# Patient Record
Sex: MALE | Race: WHITE | HISPANIC OR LATINO | Employment: FULL TIME | URBAN - METROPOLITAN AREA
[De-identification: names, ages, dates, MRNs, and addresses within clinical notes are randomized per-mention and may not be internally consistent; named-entity substitution may affect disease eponyms.]

---

## 2017-12-20 ENCOUNTER — OFFICE VISIT (OUTPATIENT)
Dept: BEHAVIORAL HEALTH | Facility: PHYSICIAN GROUP | Age: 27
End: 2017-12-20
Payer: COMMERCIAL

## 2017-12-20 DIAGNOSIS — F41.9 ANXIETY: ICD-10-CM

## 2017-12-20 PROCEDURE — 90791 PSYCH DIAGNOSTIC EVALUATION: CPT | Performed by: SOCIAL WORKER

## 2017-12-20 RX ORDER — LISINOPRIL 5 MG/1
5 TABLET ORAL DAILY
COMMUNITY
End: 2018-02-20

## 2017-12-20 RX ORDER — ALPRAZOLAM 0.5 MG/1
0.5 TABLET ORAL NIGHTLY PRN
COMMUNITY
End: 2020-03-24
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
## 2018-01-03 ENCOUNTER — OFFICE VISIT (OUTPATIENT)
Dept: BEHAVIORAL HEALTH | Facility: PHYSICIAN GROUP | Age: 28
End: 2018-01-03
Payer: COMMERCIAL

## 2018-01-03 DIAGNOSIS — F41.1 GENERALIZED ANXIETY DISORDER: ICD-10-CM

## 2018-01-03 PROCEDURE — 90834 PSYTX W PT 45 MINUTES: CPT | Performed by: SOCIAL WORKER

## 2018-01-03 NOTE — BH THERAPY
" [facility]  Therapy Progress Note    Patient Name: Librado Johnston  Patient MRN: [id number]  Today's Date: 1/3/2018     Type of session:Individual psychotherapy  Length of session: 45 minutes  Persons in attendance:Patient    Subjective/New Info: Patient reports ongoing bouts of anxiety and ruminating about ongoing headache and possible medical implications associated.  He has seen his primary care physician who he reports diagnosed him with a sinus infection and prescribed anti biotics.  Sees PCP tomorrow and says he wants to request a referral for an MRI. States his anxiety is interfering with relationship with Scot who thinks he is \"behaving like a hypochondriac.\"  Discussed patient's anxiety related to this relationship and he rates it as 9 on a scale of 1-10.  Cutes work and financial stressors also and notes he is able to work on paying off debt and seek more suitable employment while living in current situation. Librado compares himself to his mother in the realm of staying in an unfulfilling relationship rather than risk change by leaving.  He agrees to make a list of credit cards with attached debt and work on payment plan.  He will also write list of items to discuss with his doctor as means of defining specific stressors and exhibiting what control he can take at this time.      Objective/Observations:   Participation: Active verbal participation, Attentive, Engaged and Open to feedback   Grooming: Neat   Cognition: Fully Oriented   Eye contact: Good   Mood: Anxious   Affect: Constricted   Thought process: Goal-directed   Speech: Rate within normal limits   Other:     Diagnoses:   1. Generalized anxiety disorder         Current risk:   SUICIDE: Low   Homicide: Not applicable   Self-harm: Not applicable   Relapse: Not applicable   Other:    Safety Plan reviewed? Not Indicated   If evidence of imminent risk is present, intervention/plan:     Therapeutic Intervention(s): Distress tolerance skills, " Interpersonal effectiveness skills and Problem-solving    Treatment Goal(s)/Objective(s) addressed: Resolve core conflict that is the source of anxiety     Progress toward Treatment Goals: Mild improvement    Plan:Patient will reduce overall frequency, intensity, and duration of the anxiety so that daily functioning is not impaired.  - Next appointment scheduled:  1/17/2018  - Patient is in agreement with the above plan:  YES    Corinne G. Taylor, L.C.SNICHOLAS  1/3/2018

## 2018-01-17 ENCOUNTER — APPOINTMENT (OUTPATIENT)
Dept: BEHAVIORAL HEALTH | Facility: PHYSICIAN GROUP | Age: 28
End: 2018-01-17
Payer: COMMERCIAL

## 2018-02-07 ENCOUNTER — APPOINTMENT (OUTPATIENT)
Dept: BEHAVIORAL HEALTH | Facility: PHYSICIAN GROUP | Age: 28
End: 2018-02-07
Payer: COMMERCIAL

## 2018-02-15 ENCOUNTER — OFFICE VISIT (OUTPATIENT)
Dept: BEHAVIORAL HEALTH | Facility: PHYSICIAN GROUP | Age: 28
End: 2018-02-15
Payer: COMMERCIAL

## 2018-02-15 VITALS — HEIGHT: 71 IN | BODY MASS INDEX: 33.04 KG/M2 | WEIGHT: 236 LBS

## 2018-02-15 DIAGNOSIS — F41.1 GAD (GENERALIZED ANXIETY DISORDER): ICD-10-CM

## 2018-02-15 PROCEDURE — 90792 PSYCH DIAG EVAL W/MED SRVCS: CPT | Performed by: NURSE PRACTITIONER

## 2018-02-15 ASSESSMENT — PATIENT HEALTH QUESTIONNAIRE - PHQ9
SUM OF ALL RESPONSES TO PHQ9 QUESTIONS 1 AND 2: 0
1. LITTLE INTEREST OR PLEASURE IN DOING THINGS: 0
2. FEELING DOWN, DEPRESSED, IRRITABLE, OR HOPELESS: 0
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
## 2018-02-20 PROBLEM — F41.1 GAD (GENERALIZED ANXIETY DISORDER): Status: ACTIVE | Noted: 2018-02-20

## 2018-02-20 RX ORDER — DIPHENHYDRAMINE HCL 25 MG
25-50 TABLET ORAL NIGHTLY PRN
COMMUNITY
End: 2020-03-24

## 2018-02-20 RX ORDER — TIZANIDINE 4 MG/1
4 TABLET ORAL PRN
COMMUNITY
Start: 2018-01-26 | End: 2020-03-24

## 2018-03-06 ENCOUNTER — DOCUMENTATION (OUTPATIENT)
Dept: BEHAVIORAL HEALTH | Facility: PHYSICIAN GROUP | Age: 28
End: 2018-03-06

## 2018-07-25 ENCOUNTER — OFFICE VISIT (OUTPATIENT)
Dept: NEUROLOGY | Facility: MEDICAL CENTER | Age: 28
End: 2018-07-25
Payer: COMMERCIAL

## 2018-07-25 VITALS
HEIGHT: 71 IN | TEMPERATURE: 99.3 F | HEART RATE: 110 BPM | BODY MASS INDEX: 35.84 KG/M2 | SYSTOLIC BLOOD PRESSURE: 142 MMHG | DIASTOLIC BLOOD PRESSURE: 86 MMHG | OXYGEN SATURATION: 96 % | RESPIRATION RATE: 16 BRPM | WEIGHT: 256 LBS

## 2018-07-25 DIAGNOSIS — G44.209 TENSION HEADACHE: ICD-10-CM

## 2018-07-25 DIAGNOSIS — F51.01 PRIMARY INSOMNIA: ICD-10-CM

## 2018-07-25 PROCEDURE — 99204 OFFICE O/P NEW MOD 45 MIN: CPT | Performed by: PHYSICIAN ASSISTANT

## 2018-07-25 RX ORDER — INDOMETHACIN 25 MG/1
25 CAPSULE ORAL 3 TIMES DAILY
Qty: 90 CAP | Refills: 1 | Status: SHIPPED | OUTPATIENT
Start: 2018-07-25 | End: 2018-08-29

## 2018-07-25 ASSESSMENT — PATIENT HEALTH QUESTIONNAIRE - PHQ9
CLINICAL INTERPRETATION OF PHQ2 SCORE: 1
5. POOR APPETITE OR OVEREATING: 0 - NOT AT ALL
SUM OF ALL RESPONSES TO PHQ QUESTIONS 1-9: 6

## 2018-07-25 NOTE — PATIENT INSTRUCTIONS
Indocin 25 mg capsule - up to three times daily.   pepcid and take one of those while you are taking.      For about 3 days take three a day.  If headache is gone after 3 days cut back to two capsules a day.  After 3 days if headache is gone cut back to 1 capsule. Continue to take the lowest dose you need to have the headache gone for a month.     Daily Preventative Treatment:  Vitamins - handout provided    Referral to hong acharya for sleep evaluation    Yoga - gentle, restorative, yin yoga    Return appt in about 2 months

## 2018-07-25 NOTE — PROGRESS NOTES
Subjective:      Librado Johnston is a 28 y.o. male who presents with Establish Care (Headaches)    Chief Complaint/Reason for referral:  headaches    History of Present Illness:   Describe your headaches to me:  Started back in November 2017.  It was around Thanksgiving.  He had gotten a [employer] dog about two months prior.  He had a lot of stress and anxiety prior to this time.  He had a couple panic attacks around this time.  He felt tension on the top of his head around this time.    He tried multiple OTC meds and nothing worked.  He also began coughing with sinus drainage around this time.  His pcp put him on antibiotics and sinus meds - no benefit.  ENT did the same and no improvement.    He was headed out for a trip in February and he was found to have a sinus infection and deviated septum on CT.  No MRI was done.    Headaches continued daily - using flonase daily and he also underwent an allergy panel.  He had some evidence of allergic responses with grass, mites, etc all coming back positive.  He has an allergist now and skin test showed allergy to dogs.  He's doing immunotherapy at this time.    Headache daily - always across top of head or forehead.  He feels it pulsing/moving.  Mild pain - mostly now not as intense.  When he travels it can become more intense - especially if he changes elevation.  He feels a tightness across his forehead also sometimes.    He can sleep through them, do all his normal activities, typically come on some time every day and then last more than 4 hours.  Making him anxious but not really affecting his ability to .      He denies any symptoms suggestive of migraine headaches.    Do you get an aura or any symptoms that typically begin PRIOR to headache onset? no    Are you nauseated or sick to your stomach when you have a headache?  n  Does light bother you when you have a headache?   __n_______  Does sound or noise bother you when you have a headache?    ____n_____    Neurologic symptoms with headaches (weakness, numbness, vertigo, speech changes, cognitive changes)  Some tightness in his head      Do you have any neck or jaw pain with headaches?  No neck pain  Grinds teeth - has guard but doesn't wear it.  After being asked to wear it he then says he wore it everyday for two months and it didn't make a difference    Have you identified any triggers for your headaches (dehydration, poor sleep, low blood sugar, alcohol 35% (grey wine) chocolate 22%, cheese 9%, citrus fruit 11%)? none    Do you feel restless like you want to pace around with your headaches or do you feel like lying down to make your headache feel better/less severe? neither    Do headaches start by coughing, sneezing, bending over, Valsalva maneuver, sexual activity? no    Do headaches start shortly after you lie down to go to bed or shortly after you get up from bed in the morning? Typically come in during afternoon     Have you noticed a menstrual pattern to your headaches? n/a    Have you ever kept a headache diary?    How many days do you keep ANY type of headache in any given month?  3 or fewer days______   Between 3 and 6 days______   Between 6 and 10 days_____  Between 11 and 14 days____  15 or more headache/days per month_X____  Has severe headaches _0___ days per month    Family members with headaches:  Brother has migraines, maybe his mom    Co--morbid conditions:    Conditions that affect diagnosis and treatment:  Depression  Yes in past but now right now        Anxiety     Yes       Sleep disorders:   Yes - a lot of problems falling asleep.  He takes benadryl and xanax.       Obesity  Y    History of TBI N            Pregnancy/family planning   N/a    Fibromyalgia   N    Social History:  Do you drink any caffeine? Yes__X___ No____   How many days per week?  2 or fewer days______  3 or more days__X____    Do you drink alcohol?  Yes    Do you use recreational drugs including medicinal  "marijuana?  Tried mj for headaches but it didn't help    Do you smoke cigarettes? 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 none    Who and where do you live? [place] with his mom, lives in [place] on weekends - partner lives there.    What is your exercise program:  Not during work week - works 10's    Have you had an MRI done?  no      PMH reviewed - no kidney stones, no asthma, no mi, no stroke    Medications and Allergies Reviewed     What are you taking right now for your headaches/#days per month of acute medication use:     Not typically - tylenol nothing like that ever worked    Prior acute treatments:  Medication/dose/timing/route/worked or side-effects?    Tylenol and other otc meds    What are you taking right now - daily - to prevent headaches?/dose    none      Any prior prophylactic treatments:  Medications/dose/frequency/duration of treatment/worked or side effects?    Losartan for htn                                                                                                               HPI    ROS       Objective:     /86   Pulse (!) 110   Temp 37.4 °C (99.3 °F)   Resp 16   Ht 1.803 m (5' 11\")   Wt 116.1 kg (256 lb)   SpO2 96%   BMI 35.70 kg/m²      Physical Exam   Constitutional: He is oriented to person, place, and time. He appears well-developed and well-nourished.   overweight   Eyes: Pupils are equal, round, and reactive to light. EOM are normal.   Pulmonary/Chest: Effort normal.   Musculoskeletal: Normal range of motion.   Neurological: He is alert and oriented to person, place, and time. No cranial nerve deficit or sensory deficit. He exhibits normal muscle tone. Coordination normal.   Skin: Skin is warm and dry.   Psychiatric: Judgment and thought content normal.   Appears nervous               Assessment/Plan:   Tension headaches - worsened by stress and poor sleep habits.      Plan:    Indocin 25 " mg capsule - up to three times daily.   pepcid and take one of those while you are taking.      For about 3 days take three a day.  If headache is gone after 3 days cut back to two capsules a day.  After 3 days if headache is gone cut back to 1 capsule. Continue to take the lowest dose you need to have the headache gone for a month.     Daily Preventative Treatment:  Vitamins - handout provided - pick one of the meds and start taking it.    Referral to hong acharya for sleep evaluation    Yoga - gentle, restorative, yin yoga    Return appt in about 2 months

## 2018-08-29 DIAGNOSIS — Z01.812 PRE-OPERATIVE LABORATORY EXAMINATION: ICD-10-CM

## 2018-08-29 LAB
ANION GAP SERPL CALC-SCNC: 8 MMOL/L (ref 0–11.9)
BUN SERPL-MCNC: 13 MG/DL (ref 8–22)
CALCIUM SERPL-MCNC: 10 MG/DL (ref 8.5–10.5)
CHLORIDE SERPL-SCNC: 102 MMOL/L (ref 96–112)
CO2 SERPL-SCNC: 29 MMOL/L (ref 20–33)
CREAT SERPL-MCNC: 0.93 MG/DL (ref 0.5–1.4)
GLUCOSE SERPL-MCNC: 93 MG/DL (ref 65–99)
POTASSIUM SERPL-SCNC: 4.2 MMOL/L (ref 3.6–5.5)
SODIUM SERPL-SCNC: 139 MMOL/L (ref 135–145)

## 2018-08-29 PROCEDURE — 36415 COLL VENOUS BLD VENIPUNCTURE: CPT

## 2018-08-29 PROCEDURE — 80048 BASIC METABOLIC PNL TOTAL CA: CPT

## 2018-08-29 RX ORDER — METHYLPHENIDATE HYDROCHLORIDE 10 MG/1
10 TABLET ORAL EVERY MORNING
COMMUNITY
End: 2020-03-24

## 2018-08-29 RX ORDER — TRAZODONE HYDROCHLORIDE 50 MG/1
50 TABLET ORAL NIGHTLY PRN
COMMUNITY
End: 2020-03-24

## 2018-08-29 RX ORDER — LOSARTAN POTASSIUM 25 MG/1
25 TABLET ORAL EVERY MORNING
COMMUNITY
End: 2020-03-24

## 2018-09-04 ENCOUNTER — HOSPITAL ENCOUNTER (OUTPATIENT)
Facility: MEDICAL CENTER | Age: 28
End: 2018-09-04
Attending: OTOLARYNGOLOGY | Admitting: OTOLARYNGOLOGY
Payer: COMMERCIAL

## 2018-09-04 VITALS
DIASTOLIC BLOOD PRESSURE: 93 MMHG | BODY MASS INDEX: 35.37 KG/M2 | RESPIRATION RATE: 16 BRPM | WEIGHT: 252.65 LBS | TEMPERATURE: 97.4 F | HEART RATE: 94 BPM | OXYGEN SATURATION: 96 % | SYSTOLIC BLOOD PRESSURE: 160 MMHG | HEIGHT: 71 IN

## 2018-09-04 PROCEDURE — 160048 HCHG OR STATISTICAL LEVEL 1-5: Performed by: OTOLARYNGOLOGY

## 2018-09-04 PROCEDURE — 160035 HCHG PACU - 1ST 60 MINS PHASE I: Performed by: OTOLARYNGOLOGY

## 2018-09-04 PROCEDURE — 160029 HCHG SURGERY MINUTES - 1ST 30 MINS LEVEL 4: Performed by: OTOLARYNGOLOGY

## 2018-09-04 PROCEDURE — 700101 HCHG RX REV CODE 250

## 2018-09-04 PROCEDURE — 502573 HCHG PACK, ENT: Performed by: OTOLARYNGOLOGY

## 2018-09-04 PROCEDURE — 500331 HCHG COTTONOID, SURG PATTIE: Performed by: OTOLARYNGOLOGY

## 2018-09-04 PROCEDURE — 160036 HCHG PACU - EA ADDL 30 MINS PHASE I: Performed by: OTOLARYNGOLOGY

## 2018-09-04 PROCEDURE — 110454 HCHG SHELL REV 250: Performed by: OTOLARYNGOLOGY

## 2018-09-04 PROCEDURE — 700111 HCHG RX REV CODE 636 W/ 250 OVERRIDE (IP)

## 2018-09-04 PROCEDURE — 501404 HCHG SPLINT, NASAL DOYLE AIRWAY: Performed by: OTOLARYNGOLOGY

## 2018-09-04 PROCEDURE — 160002 HCHG RECOVERY MINUTES (STAT): Performed by: OTOLARYNGOLOGY

## 2018-09-04 PROCEDURE — A9270 NON-COVERED ITEM OR SERVICE: HCPCS

## 2018-09-04 PROCEDURE — 700102 HCHG RX REV CODE 250 W/ 637 OVERRIDE(OP)

## 2018-09-04 PROCEDURE — 501838 HCHG SUTURE GENERAL: Performed by: OTOLARYNGOLOGY

## 2018-09-04 PROCEDURE — 160041 HCHG SURGERY MINUTES - EA ADDL 1 MIN LEVEL 4: Performed by: OTOLARYNGOLOGY

## 2018-09-04 PROCEDURE — 160009 HCHG ANES TIME/MIN: Performed by: OTOLARYNGOLOGY

## 2018-09-04 RX ORDER — OXYMETAZOLINE HYDROCHLORIDE 0.05 G/100ML
SPRAY NASAL
Status: DISCONTINUED | OUTPATIENT
Start: 2018-09-04 | End: 2018-09-04 | Stop reason: HOSPADM

## 2018-09-04 RX ORDER — LIDOCAINE HYDROCHLORIDE AND EPINEPHRINE 10; 10 MG/ML; UG/ML
INJECTION, SOLUTION INFILTRATION; PERINEURAL
Status: DISCONTINUED | OUTPATIENT
Start: 2018-09-04 | End: 2018-09-04 | Stop reason: HOSPADM

## 2018-09-04 RX ORDER — OXYMETAZOLINE HYDROCHLORIDE 0.05 G/100ML
SPRAY NASAL
Status: DISCONTINUED
Start: 2018-09-04 | End: 2018-09-04 | Stop reason: HOSPADM

## 2018-09-04 RX ORDER — ACETAMINOPHEN 500 MG
1000 TABLET ORAL EVERY 6 HOURS
Status: DISCONTINUED | OUTPATIENT
Start: 2018-09-04 | End: 2018-09-04 | Stop reason: HOSPADM

## 2018-09-04 RX ORDER — SODIUM CHLORIDE, SODIUM LACTATE, POTASSIUM CHLORIDE, CALCIUM CHLORIDE 600; 310; 30; 20 MG/100ML; MG/100ML; MG/100ML; MG/100ML
INJECTION, SOLUTION INTRAVENOUS CONTINUOUS
Status: DISCONTINUED | OUTPATIENT
Start: 2018-09-04 | End: 2018-09-04 | Stop reason: HOSPADM

## 2018-09-04 RX ORDER — OXYCODONE HYDROCHLORIDE 5 MG/1
TABLET ORAL
Status: DISCONTINUED
Start: 2018-09-04 | End: 2018-09-04 | Stop reason: HOSPADM

## 2018-09-04 RX ORDER — OXYMETAZOLINE HYDROCHLORIDE 0.05 G/100ML
SPRAY NASAL
Status: COMPLETED
Start: 2018-09-04 | End: 2018-09-04

## 2018-09-04 RX ORDER — OXYCODONE HYDROCHLORIDE AND ACETAMINOPHEN 5; 325 MG/1; MG/1
TABLET ORAL
Status: COMPLETED
Start: 2018-09-04 | End: 2018-09-04

## 2018-09-04 RX ORDER — OXYCODONE HYDROCHLORIDE 5 MG/1
5 TABLET ORAL
Status: DISCONTINUED | OUTPATIENT
Start: 2018-09-04 | End: 2018-09-04 | Stop reason: HOSPADM

## 2018-09-04 RX ORDER — BACITRACIN ZINC 500 [USP'U]/G
OINTMENT TOPICAL
Status: DISCONTINUED
Start: 2018-09-04 | End: 2018-09-04 | Stop reason: HOSPADM

## 2018-09-04 RX ORDER — LIDOCAINE HYDROCHLORIDE AND EPINEPHRINE 10; 10 MG/ML; UG/ML
INJECTION, SOLUTION INFILTRATION; PERINEURAL
Status: DISCONTINUED
Start: 2018-09-04 | End: 2018-09-04 | Stop reason: HOSPADM

## 2018-09-04 RX ORDER — BACITRACIN ZINC 500 [USP'U]/G
OINTMENT TOPICAL
Status: DISCONTINUED | OUTPATIENT
Start: 2018-09-04 | End: 2018-09-04 | Stop reason: HOSPADM

## 2018-09-04 RX ORDER — ONDANSETRON 2 MG/ML
4 INJECTION INTRAMUSCULAR; INTRAVENOUS
Status: DISCONTINUED | OUTPATIENT
Start: 2018-09-04 | End: 2018-09-04 | Stop reason: HOSPADM

## 2018-09-04 RX ORDER — LIDOCAINE HYDROCHLORIDE 10 MG/ML
0.5 INJECTION, SOLUTION INFILTRATION; PERINEURAL
Status: DISCONTINUED | OUTPATIENT
Start: 2018-09-04 | End: 2018-09-04 | Stop reason: HOSPADM

## 2018-09-04 RX ADMIN — OXYCODONE AND ACETAMINOPHEN 2 TABLET: 5; 325 TABLET ORAL at 12:10

## 2018-09-04 RX ADMIN — FENTANYL CITRATE 25 MCG: 50 INJECTION, SOLUTION INTRAMUSCULAR; INTRAVENOUS at 13:10

## 2018-09-04 RX ADMIN — FENTANYL CITRATE 25 MCG: 50 INJECTION, SOLUTION INTRAMUSCULAR; INTRAVENOUS at 13:05

## 2018-09-04 RX ADMIN — SODIUM CHLORIDE, SODIUM LACTATE, POTASSIUM CHLORIDE, CALCIUM CHLORIDE 1000 ML: 600; 310; 30; 20 INJECTION, SOLUTION INTRAVENOUS at 09:49

## 2018-09-04 RX ADMIN — OXYMETAZOLINE HYDROCHLORIDE 2 SPRAY: 5 SPRAY NASAL at 09:20

## 2018-09-04 ASSESSMENT — PAIN SCALES - GENERAL
PAINLEVEL_OUTOF10: 5
PAINLEVEL_OUTOF10: 0
PAINLEVEL_OUTOF10: 2
PAINLEVEL_OUTOF10: 2
PAINLEVEL_OUTOF10: 3
PAINLEVEL_OUTOF10: 0
PAINLEVEL_OUTOF10: 2
PAINLEVEL_OUTOF10: 3
PAINLEVEL_OUTOF10: 5
PAINLEVEL_OUTOF10: 3

## 2018-09-04 NOTE — OR SURGEON
Immediate Post OP Note    PreOp Diagnosis: DNS, Bilateral Inferior Turbinate Hypertrophy.     PostOp Diagnosis: sane    Procedure(s):  TURBINATE REDUCTION - SUBMUCOSAL APPROACH - Wound Class: Clean Contaminated  SEPTOPLASTY - Wound Class: Clean Contaminated    Surgeon(s):  Alexis Syed M.D.    Anesthesiologist/Type of Anesthesia:  Anesthesiologist: Ernie Norton D.O./General    Surgical Staff:  Circulator: Floresita Roe R.N.  Relief Circulator: Chante Phelan R.N.  Scrub Person: Quinten Ronquillo    Specimens removed if any:  * No specimens in log *    Estimated Blood Loss: 50 cc    Findings: Severe left DNS, 3+, bilateral inferior turbinate hypertrophy    Complications: none        9/4/2018 11:29 AM Alexis Syed M.D.

## 2018-09-04 NOTE — OR NURSING
1125-assumed care.  States comfort. Small amount of drainage to nasal gauze.    1145-taking PO fluids.    1210-C/O pain 5/10, medicated with crackers & water    1230-states some comfort  1240-report to Dee CHAO.  1330-assumed care.  States comfort.  Ice pack to forehead area in place  1345-Friend in to visit.  1400-D/C instructions discussed & signed.  Questions answered  1420-D/C via W/C to car.  States comfort. Nasal drip pad changed

## 2018-09-04 NOTE — OR NURSING
1115 Patient arrived from OR on [facility]. Report received from anesthesia and RN. Oral airway in place. Will continue to monitor.   1119 pt woke up, airway out  1120 Hand-off to JEREMIE Fernandez

## 2018-09-04 NOTE — DISCHARGE INSTRUCTIONS
1210  ACTIVITY: Rest and take it easy for the first 24 hours.  A responsible adult is recommended to remain with you during that time.  It is normal to feel sleepy.  We encourage you to not do anything that requires balance, judgment or coordination.  MILD FLU-LIKE SYMPTOMS ARE NORMAL. YOU MAY EXPERIENCE GENERALIZED MUSCLE ACHES, THROAT IRRITATION, HEADACHE AND/OR SOME NAUSEA.    FOR 24 HOURS DO NOT:  Drive, operate machinery or run household appliances.  Drink beer or alcoholic beverages.   Make important decisions or sign legal documents.    SPECIAL INSTRUCTIONS: *FOLLOW DR CHISHOLM'S INSTRUCTIONS**  Diet as tolerated, no strenuous activity, may shower in 24 h.  Afrin 3 sprays to affected nostril prn heavy bleeding.  Follow up:9/7 2:30 pm.     DIET: To avoid nausea, slowly advance diet as tolerated, avoiding spicy or greasy foods for the first day.  Add more substantial food to your diet according to your physician's instructions.  Babies can be fed formula or breast milk as soon as they are hungry.  INCREASE FLUIDS AND FIBER TO AVOID CONSTIPATION.    SURGICAL DRESSING/BATHING: *MAY SHOWER TOMORROW 9/5**    FOLLOW-UP APPOINTMENT:  A follow-up appointment  *is 9/7 Friday at 2:30pm*    You should CALL YOUR PHYSICIAN if you develop:  Fever greater than 101 degrees F.  Pain not relieved by medication, or persistent nausea or vomiting.  Excessive bleeding (blood soaking through dressing) or unexpected drainage from the wound.  Extreme redness or swelling around the incision site, drainage of pus or foul smelling drainage.  Inability to urinate or empty your bladder within 8 hours.  Problems with breathing or chest pain.    You should call 911 if you develop problems with breathing or chest pain.  If you are unable to contact your doctor or surgical center, you should go to the nearest emergency room or urgent care center.  Physician's telephone #: *[phone]**    If any questions arise, call your doctor.  If your doctor  is not available, please feel free to call the Surgical Center at [phone].  The Center is open Monday through Friday from 7AM to 7PM.  You can also call the HEALTH HOTLINE open 24 hours/day, 7 days/week and speak to a nurse at [phone], or toll free at [phone].    A registered nurse may call you a few days after your surgery to see how you are doing after your procedure.    MEDICATIONS: Resume taking daily medication.  Take prescribed pain medication with food.  If no medication is prescribed, you may take non-aspirin pain medication if needed.  PAIN MEDICATION CAN BE VERY CONSTIPATING.  Take a stool softener or laxative such as senokot, pericolace, or milk of magnesia if needed.    Prescription given for *Oxycodone, Keflex, Zofran**.  Last pain medication given at *Oxycodone 5/325 (2 tabs) at *1210*.    If your physician has prescribed pain medication that includes Acetaminophen (Tylenol), do not take additional Acetaminophen (Tylenol) while taking the prescribed medication.    Depression / Suicide Risk    As you are discharged from this [facility] facility, it is important to learn how to keep safe from harming yourself.    Recognize the warning signs:  · Abrupt changes in personality, positive or negative- including increase in energy   · Giving away possessions  · Change in eating patterns- significant weight changes-  positive or negative  · Change in sleeping patterns- unable to sleep or sleeping all the time   · Unwillingness or inability to communicate  · Depression  · Unusual sadness, discouragement and loneliness  · Talk of wanting to die  · Neglect of personal appearance   · Rebelliousness- reckless behavior  · Withdrawal from people/activities they love  · Confusion- inability to concentrate     If you or a loved one observes any of these behaviors or has concerns about self-harm, here's what you can do:  · Talk about it- your feelings and reasons for harming yourself  · Remove any  means that you might use to hurt yourself (examples: pills, rope, extension cords, firearm)  · Get professional help from the community (Mental Health, Substance Abuse, psychological counseling)  · Do not be alone:Call your Safe Contact- someone whom you trust who will be there for you.  · Call your local CRISIS HOTLINE 684-2482 or 692-138-0808  · Call your local Children's Mobile Crisis Response Team [place] (983) 168-6282 or www.Digitick  · Call the toll free National Suicide Prevention Hotlines   · National Suicide Prevention Lifeline 782-251-TIID (7921)  · National Hope Line Network 800-SUICIDE (091-9669)

## 2018-09-04 NOTE — OR NURSING
1240 Report received and care assumed. Pt appears comfortable. Pain 4/10 and tolerable, no nausea, tolerating fluids well. Nasal sling replaced taped gauze, scant amount of oozing noted each nostril and clots noted bilaterally.  1305 Pt tolerating sips of soda. Pt's pain increasing 5/10. IV pain medication provided. See MAR. Facial ice pack placed. Pt's ride called.  1310 Additional medication provided, see MAR.  1325 Pt states pain tolerable 3/10, denies nausea. Report given to JEREMIE Fernandez and care transferred.

## 2018-09-05 NOTE — OP REPORT
DATE OF SERVICE:  09/04/2018    PREOPERATIVE DIAGNOSES:  1.  Deviated nasal septum.  2.  Bilateral inferior turbinate hypertrophy.    POSTOPERATIVE DIAGNOSES:  1.  Deviated nasal septum.  2.  Bilateral inferior turbinate hypertrophy.    PROCEDURES:  1.  Nasal septal reconstruction.  2.  Bilateral inferior turbinoplasties.    SURGEON:  Alexis Syed MD    ANESTHESIOLOGIST:  Ernie Norton DO    INDICATIONS:  The patient is a 28-year-old who has had a history of nasal   obstruction and discomfort in the left side of his head and in between his eye   and his nose.  He has had CT scanning, which revealed a significantly   deviated septum to the left, but no evidence of sinusitis.  The patient has   been on nasal steroids and has seen a neurologist for the head pain.  He was   put on indomethacin, but that failed to resolve his problem.  He has tried   coming off his caffeine, but that has not helped either.  Physical exam   revealed a septal deviation to the left and inferior turbinate hypertrophy.    He presents now for nasal surgery for obstruction.  I also hope that part of   his headache may be rhinogenic due to the severity of the septal deviation.    DESCRIPTION OF PROCEDURE:  The patient was taken to the operating room and   placed in a supine position.  General anesthesia was induced and the patient   was easily intubated.  Lidocaine 1% with 1:100,000 epinephrine was used to   infiltrate the septum on both sides as well as the leading edge of each   inferior turbinate using approximately 4 mL overall.  The nasal cavity was   then packed with Afrin-soaked sponge.  The patient was then draped in the   usual fashion for this type of procedure.  After draping, the cottonoids on   the left side were removed.  An incision was made along the caudal aspect of   the septum on the left and taken to and through the perichondrium.  A   subperichondrial flap was elevated off the deviated portions of bone and    cartilage on the left side.  The patient had a significantly deviated septum   with a large spur posteriorly.  An incision was made at the bony cartilaginous   junction and a flap elevated on the right side.  Deviated portions of bone   and cartilage were then removed back to the sphenoid face.  Additionally, a   strip of cartilage was taken inferiorly off the maxillary crest allowing the   cartilage to sit more neutrally in the midline.  Nasal tip support was still   excellent.  This resolved treatment on the septum.  The right inferior   turbinate was then addressed.  An incision was made anteriorly and then a   caudal elevator used to elevate the mucosa off the turbinate bone.  A 2.9 mm   shaver blade was then used to submucosally debride the turbinate.  The   turbinate was then partially resected with the turbinate scissors as well.    The posterior tuft of turbinate was debrided with a shaver blade and then   suction cautery used to lightly cauterize the cut edge of the turbinate.  The   procedure was repeated identically on the opposite side.  The nasal cavity was   then vigorously irrigated and the irrigant suctioned.  Lomax splints were   placed and sutured into position with 3-0 nylon.  Surgiflo was then placed   along the cut edge of each inferior turbinate.  The patient was then awakened   and taken to the recovery room in stable condition.  He tolerated the   procedure well and there were no complications.  Estimated blood loss was 20   mL.       ____________________________________     MD VINNIE FLETCHER / RAMAN    DD:  09/05/2018 11:56:41  DT:  09/05/2018 12:24:23    D#:  [id number]  Job#:  [id number]    cc: JUDY ZHU MD

## 2018-12-26 ENCOUNTER — OFFICE VISIT (OUTPATIENT)
Dept: NEUROLOGY | Facility: MEDICAL CENTER | Age: 28
End: 2018-12-26
Payer: COMMERCIAL

## 2018-12-26 VITALS
SYSTOLIC BLOOD PRESSURE: 138 MMHG | DIASTOLIC BLOOD PRESSURE: 82 MMHG | TEMPERATURE: 98.1 F | HEIGHT: 71 IN | HEART RATE: 82 BPM | BODY MASS INDEX: 34.97 KG/M2 | WEIGHT: 249.8 LBS | OXYGEN SATURATION: 97 %

## 2018-12-26 DIAGNOSIS — G44.209 TENSION HEADACHE: ICD-10-CM

## 2018-12-26 PROCEDURE — 99213 OFFICE O/P EST LOW 20 MIN: CPT | Performed by: PHYSICIAN ASSISTANT

## 2018-12-26 RX ORDER — AMITRIPTYLINE HYDROCHLORIDE 10 MG/1
TABLET, FILM COATED ORAL
Qty: 75 TAB | Refills: 11 | Status: SHIPPED | OUTPATIENT
Start: 2018-12-26 | End: 2018-12-26 | Stop reason: SDUPTHER

## 2018-12-26 RX ORDER — ZOLPIDEM TARTRATE 10 MG/1
10 TABLET ORAL NIGHTLY PRN
COMMUNITY
End: 2020-09-22

## 2018-12-26 RX ORDER — DEXTROAMPHETAMINE SACCHARATE, AMPHETAMINE ASPARTATE, DEXTROAMPHETAMINE SULFATE AND AMPHETAMINE SULFATE 2.5; 2.5; 2.5; 2.5 MG/1; MG/1; MG/1; MG/1
10 TABLET ORAL 2 TIMES DAILY
COMMUNITY

## 2018-12-26 RX ORDER — AMITRIPTYLINE HYDROCHLORIDE 10 MG/1
TABLET, FILM COATED ORAL
Qty: 75 TAB | Refills: 11 | Status: SHIPPED | OUTPATIENT
Start: 2018-12-26 | End: 2020-03-24

## 2018-12-26 NOTE — PROGRESS NOTES
Cc:  Migraine/headache follow up    Last visit was the first time I saw this patient 7/25/18  At that time:    How many days do you keep ANY type of headache in any given month?  3 or fewer days______   Between 3 and 6 days______   Between 6 and 10 days_____  Between 11 and 14 days____  15 or more headache/days per month_X____  Has severe headaches _0___ days per month    What are you taking right now for your headaches/#days per month of acute medication use:      Not typically - tylenol nothing like that ever worked     Prior acute treatments:  Medication/dose/timing/route/worked or side-effects?     Tylenol and other otc meds     What are you taking right now - daily - to prevent headaches?/dose     none      Any prior prophylactic treatments:  Medications/dose/frequency/duration of treatment/worked or side effects?     Losartan for htn    Our plan at the last visit:    Tension headaches - worsened by stress and poor sleep habits.       Plan:     Indocin 25 mg capsule - up to three times daily.   pepcid and take one of those while you are taking.       For about 3 days take three a day.  If headache is gone after 3 days cut back to two capsules a day.  After 3 days if headache is gone cut back to 1 capsule. Continue to take the lowest dose you need to have the headache gone for a month.      Daily Preventative Treatment:  Vitamins - handout provided - pick one of the meds and start taking it.     Referral to hong acharya for sleep evaluation     Yoga - gentle, restorative, yin yoga     Return appt in about 2 months        Interval History:      Per OR note pt reported that the indocin did not help with reduction in headache frequency. He was found to have severely deviated septum and underwent surgery in hope this would help alleviate the problem.   Pt said it is helping some but ENT said it could take up to a year to reduce headache frequency.    He feels his headaches are worse with he is at work -  probably related to stress he thinks.  I ?? If fluorescent lights could be contributing although his headaches do not have many migrainus features    Tension headache frequency: daily now.    Indocin 25 mg  - how many taking??  Quit taking because he said it didn't work.  Never tried more than 25 mg at a time    Prevention:  Vitamin started? No forgot  Did you see Khadijah Buck? No. Says insurance wouldn't cover  Stress reduction with yoga??  Getting some exercise now he says but not helping with headache.    I suggested to patient taking a daily headache.  He was hesitant.  First question he asked is whether he can still drink alcohol with medication.  I counseled against use of alcohol in general because it is such a big trigger and then he said he never really drinks.  Then discussion regarding ambien.  Pt does not wish to take daily medicine that could reduce headache frequency and help with sleep but takes ambien almost nightly.  I advised that he should not be using ambien with such frequency and that elavil is better option and pt agreed to try it but I'm not sure he will comply.  He wants a cure for his daily headaches but he needs to continue to work toward good sleep, healthy lifestyle (exercise and healthy eating), stress reduction such as mindfulness or yoga and waiting for healing of septum.          Plan:    Tension headache worsened by stress and poor sleep habits: see comments above and elavil started    rTC 6 months or earlier if elavil doesn't help.    Total time with this visit:    20 Minutes face-to-face with patient. More than 50% of this visit was spent educating patient on their illness and/or coordinating care, as detailed above

## 2019-09-12 ENCOUNTER — TELEPHONE (OUTPATIENT)
Dept: NEUROLOGY | Facility: MEDICAL CENTER | Age: 29
End: 2019-09-12

## 2019-09-12 ENCOUNTER — OFFICE VISIT (OUTPATIENT)
Dept: NEUROLOGY | Facility: MEDICAL CENTER | Age: 29
End: 2019-09-12
Payer: COMMERCIAL

## 2019-09-12 VITALS
HEIGHT: 71 IN | TEMPERATURE: 97.5 F | SYSTOLIC BLOOD PRESSURE: 122 MMHG | WEIGHT: 247 LBS | DIASTOLIC BLOOD PRESSURE: 66 MMHG | BODY MASS INDEX: 34.58 KG/M2 | OXYGEN SATURATION: 97 % | HEART RATE: 81 BPM | RESPIRATION RATE: 16 BRPM

## 2019-09-12 DIAGNOSIS — R51.9 HEADACHE AROUND THE EYES: ICD-10-CM

## 2019-09-12 PROCEDURE — 99213 OFFICE O/P EST LOW 20 MIN: CPT

## 2019-09-12 RX ORDER — SUMATRIPTAN AND NAPROXEN SODIUM 85; 500 MG/1; MG/1
1 TABLET, FILM COATED ORAL
Qty: 12 TAB | Refills: 3 | Status: SHIPPED | OUTPATIENT
Start: 2019-09-12 | End: 2020-03-24

## 2019-09-12 RX ORDER — PROPRANOLOL HYDROCHLORIDE 10 MG/1
10 TABLET ORAL
Refills: 1 | COMMUNITY
Start: 2019-08-22 | End: 2023-10-24

## 2019-09-12 RX ORDER — BUTALBITAL/ACETAMINOPHEN 50MG-325MG
TABLET ORAL
Refills: 2 | COMMUNITY
Start: 2019-08-14 | End: 2020-03-24

## 2019-09-12 ASSESSMENT — PATIENT HEALTH QUESTIONNAIRE - PHQ9: CLINICAL INTERPRETATION OF PHQ2 SCORE: 0

## 2019-09-12 NOTE — TELEPHONE ENCOUNTER
Patient called stating that the  told him the Botox is taking 3 month to get auth. He is wondering if he should do a different  Treatment?    Please advise    Thank you

## 2019-09-12 NOTE — PROGRESS NOTES
Follow up for Tension headaches  Patient used to follow up with kyra HAGAN   HPI  Pleasant 30 yo male with headaches. Followed with robyn HAGAN  Stress related   Pain on top of his head  Frontal sinuses  He has been having these headaches two years   Daily headaches   Several hours 1h   Do not wake him up  When they get really bad 6-7/10  Takes tylenol and advil   Takes fioricet when   No migrainous     stried steroid dose pack   Tried tizanidine   Tried amitriptyline   Propranolol tried   Most on top of his head      MRI brain to do  Anxiety   ADD      ambien - does not take  every night     Review of Systems   Constitutional: Negative.    HENT: Negative.    Eyes: Negative.    Respiratory: Negative.    Cardiovascular: Negative.    Gastrointestinal: Negative.    Genitourinary: Negative.    Musculoskeletal: Negative.    Skin: Negative.    Neurological: Positive for headaches.   Endo/Heme/Allergies: Negative.    Psychiatric/Behavioral: The patient is nervous/anxious and has insomnia.      Past Medical History:   Diagnosis Date   • ADD (attention deficit disorder)    • Anxiety    • Environmental and seasonal allergies    • Hypertension    • Snoring     no sleep study     Past Surgical History:   Procedure Laterality Date   • TURBINATE REDUCTION Bilateral 9/4/2018    Procedure: TURBINATE REDUCTION - SUBMUCOSAL APPROACH;  Surgeon: Alexis Syed M.D.;  Location: SURGERY SAME DAY [facility];  Service: Ent   • SEPTOPLASTY Bilateral 9/4/2018    Procedure: SEPTOPLASTY;  Surgeon: Alexis Syed M.D.;  Location: SURGERY SAME DAY [facility];  Service: Ent     Social History     Social History Narrative   • Not on file     Family History   Problem Relation Age of Onset   • Depression Mother    • Bipolar disorder Neg Hx    • Schizophrenia Neg Hx    • Suicide Attempts Neg Hx      Current Outpatient Medications on File Prior to Visit   Medication Sig Dispense Refill   • ACETAMINOPHEN-BUTALBITAL  MG Tab TAKE 1  TABLET BY MOUTH EVERY 6 HOURS AS NEEDED SEVERE FOR HEADACHE (G44.209/30DS)  2   • propranolol (INDERAL) 10 MG Tab Take 10 mg by mouth 1 time daily as needed.  1   • zolpidem (AMBIEN) 10 MG Tab Take 10 mg by mouth at bedtime as needed for Sleep.     • amphetamine-dextroamphetamine (ADDERALL, 10MG,) 10 MG Tab Take 5 mg by mouth 2 times a day.     • Multiple Vitamins-Minerals (DAILY MULTIVITAMIN PO) Take 1 Tab by mouth every day.     • alprazolam (XANAX) 0.5 MG Tab Take 0.5 mg by mouth at bedtime as needed for Sleep or Anxiety.     • amitriptyline (ELAVIL) 10 MG Tab 10 mg at bedtime.  Increase 10 mg every 7 days to target 50 mg bedtime in 5 weeks. (Patient not taking: Reported on 9/12/2019) 75 Tab 11   • losartan (COZAAR) 25 MG Tab Take 25 mg by mouth every morning.     • traZODone (DESYREL) 50 MG Tab Take 50 mg by mouth at bedtime as needed for Sleep.     • methylphenidate (RITALIN) 10 MG Tab Take 10 mg by mouth every morning. Monday - Thursday     • tizanidine (ZANAFLEX) 4 MG Tab Take 4 mg by mouth as needed.     • diphenhydrAMINE (BENADRYL) 25 MG Tab Take 25-50 mg by mouth at bedtime as needed.       No current facility-administered medications on file prior to visit.        Imaging   No bran imaging     Lab Results   Component Value Date/Time    SODIUM 139 08/29/2018 07:45 AM    POTASSIUM 4.2 08/29/2018 07:45 AM    CHLORIDE 102 08/29/2018 07:45 AM    CO2 29 08/29/2018 07:45 AM    GLUCOSE 93 08/29/2018 07:45 AM    BUN 13 08/29/2018 07:45 AM    CREATININE 0.93 08/29/2018 07:45 AM    No results found for: WBC, RBC, HEMOGLOBIN, HEMATOCRIT, MCV, MCH, MCHC, MPV, NEUTSPOLYS, LYMPHOCYTES, MONOCYTES, EOSINOPHILS, BASOPHILS, HYPOCHROMIA, ANISOCYTOSIS     Impression and plan     Migraine headaches   ADD  Anxiety  Plan  Botox   Every day headaches > 15 days per month usually   Tried multiple preventatives including elavil   topamax in the past   Propranolol tried and did not work gave him SE including fatigue  And  antidepressant   zanaflex  Tried steroids     Plan  Botox injections   Imitrex prn limit to 10 per month for abortive treatment   Limit Zolpidem   We discussed CGRP preventatives and will re address this during next visit   Discussed exercise weight loss  Diet  Avoid triggers  Hydration       RTC for Botox    Patient was seen for 20 minutes face to face of which > 50% of appointment time was spent on counseling and coordination of care regarding the above.

## 2019-09-16 ASSESSMENT — ENCOUNTER SYMPTOMS
CARDIOVASCULAR NEGATIVE: 1
EYES NEGATIVE: 1
HEADACHES: 1
INSOMNIA: 1
CONSTITUTIONAL NEGATIVE: 1
MUSCULOSKELETAL NEGATIVE: 1
RESPIRATORY NEGATIVE: 1
NERVOUS/ANXIOUS: 1
GASTROINTESTINAL NEGATIVE: 1

## 2019-09-17 NOTE — TELEPHONE ENCOUNTER
I am not sure why 3 months hold up?   It should be faster...   Lets see with pushpa      Spoke to patient gave above information.  Asked him to call after MRI is done.

## 2019-09-19 ENCOUNTER — PATIENT MESSAGE (OUTPATIENT)
Dept: NEUROLOGY | Facility: MEDICAL CENTER | Age: 29
End: 2019-09-19

## 2019-09-23 NOTE — TELEPHONE ENCOUNTER
From: Librado Johnston  To: Brandon Taylor M.D.  Sent: 9/19/2019 4:43 PM PDT  Subject: Non-Urgent Medical Question    Dr. MICHAEL Simon sent a prescription for SUMATRIPTAN-NAPROXEN  MG TABLET to my pharmacy at [facility] and I called them to check status and they told me it's a drug my insurance doesn't cover. [facility] told me they don't pay the combo but will pay SUMATRIPTAN and NAPROXEN but as two separate prescriptions. [facility] also says they sent you guys a notification about this. Can someone look into this and let me know what to do?    Thank you for your time,    Librado Johnston  [phone]

## 2019-09-25 ENCOUNTER — TELEPHONE (OUTPATIENT)
Dept: NEUROLOGY | Facility: MEDICAL CENTER | Age: 29
End: 2019-09-25

## 2019-09-25 RX ORDER — NAPROXEN SODIUM 550 MG/1
TABLET ORAL
Qty: 45 TAB | Refills: 1 | Status: SHIPPED | OUTPATIENT
Start: 2019-09-25 | End: 2020-07-30 | Stop reason: SDUPTHER

## 2019-09-25 RX ORDER — SUMATRIPTAN 100 MG/1
TABLET, FILM COATED ORAL
Qty: 9 TAB | Refills: 6 | Status: SHIPPED | OUTPATIENT
Start: 2019-09-25 | End: 2020-07-30

## 2019-09-26 NOTE — TELEPHONE ENCOUNTER
----- Message from Letty Henry, Med Ass't sent at 9/23/2019 10:47 AM PDT -----  Regarding: FW: Non-Urgent Medical Question  Contact: [phone]  Could you please change script?    Thank you  ----- Message -----  From: Librado Johnston  Sent: 9/19/2019   4:43 PM PDT  To: Neurology Mas  Subject: Non-Urgent Medical Question                      Dr. MICHAEL Simon sent a prescription for SUMATRIPTAN-NAPROXEN  MG TABLET to my pharmacy at [facility] and I called them to check status and they told me it's a drug my insurance doesn't cover. [facility] told me they don't pay the combo but will pay SUMATRIPTAN and NAPROXEN but as two separate prescriptions. [facility] also says they sent you guys a notification about this. Can someone look into this and let me know what to do?    Thank you for your time,    Librado Johnston  [phone]

## 2019-09-26 NOTE — TELEPHONE ENCOUNTER
I forwarded a prescription for naproxen 550 mg and Imitrex 100 mg, the patient is to take 1 of each when the pain begins, this can be repeated in 1 hour if needed.

## 2019-09-27 NOTE — TELEPHONE ENCOUNTER
Ti Garnett M.D. 2 days ago         I forwarded a prescription for naproxen 550 mg and Imitrex 100 mg, the patient is to take 1 of each when the pain begins, this can be repeated in 1 hour if needed.        Called lm for  patient above information.

## 2019-11-21 DIAGNOSIS — G43.709 CHRONIC MIGRAINE W/O AURA W/O STATUS MIGRAINOSUS, NOT INTRACTABLE: ICD-10-CM

## 2019-11-26 DIAGNOSIS — G43.709 CHRONIC MIGRAINE W/O AURA W/O STATUS MIGRAINOSUS, NOT INTRACTABLE: ICD-10-CM

## 2019-12-11 ENCOUNTER — TELEPHONE (OUTPATIENT)
Dept: NEUROLOGY | Facility: MEDICAL CENTER | Age: 29
End: 2019-12-11

## 2019-12-11 NOTE — TELEPHONE ENCOUNTER
From: Librado Johnston  To: Brandon Taylor M.D.  Sent: 12/4/2019  8:38 PM PST  Subject: Prescription Question    Hello,    My insurance called and left me a voice mail to say that my Botox injections have been approved and all they are waiting for is for Dr. RODRIGUEZ to send a proscription to [facility]. [facility] says you could either call [phone] or fax a prescription to [phone]. Will someone please send a prescription to them so I can get my Botox filled? They are waiting they said. Will someone also please call me to let me know it was been done at [phone] so I can call [facility] back about this?    Thank you for your time,    Librado Ankur    Patient called stating he has not heard back from our office..Script for the botox has not been sent and pharmacy is waiting for it. He wants to use the last of his benefit for this calendar year.    Please advise     Thank you

## 2019-12-17 ENCOUNTER — TELEPHONE (OUTPATIENT)
Dept: NEUROLOGY | Facility: MEDICAL CENTER | Age: 29
End: 2019-12-17

## 2019-12-17 NOTE — TELEPHONE ENCOUNTER
Patient called stating the pharmacy is asking him when he's scheduled for botox so they can ship it. Pharmacy stated it would take 1 day to get here. I have a 20 min slot on 12/26/2019. Could we schedule him in that slot?      Please call patient    Thank you

## 2019-12-26 ENCOUNTER — OFFICE VISIT (OUTPATIENT)
Dept: NEUROLOGY | Facility: MEDICAL CENTER | Age: 29
End: 2019-12-26
Payer: COMMERCIAL

## 2019-12-26 VITALS
SYSTOLIC BLOOD PRESSURE: 130 MMHG | OXYGEN SATURATION: 96 % | BODY MASS INDEX: 35.56 KG/M2 | DIASTOLIC BLOOD PRESSURE: 74 MMHG | HEIGHT: 71 IN | WEIGHT: 254 LBS | TEMPERATURE: 97.7 F | RESPIRATION RATE: 16 BRPM | HEART RATE: 95 BPM

## 2019-12-26 DIAGNOSIS — G43.709 CHRONIC MIGRAINE W/O AURA W/O STATUS MIGRAINOSUS, NOT INTRACTABLE: ICD-10-CM

## 2019-12-26 PROCEDURE — 64615 CHEMODENERV MUSC MIGRAINE: CPT

## 2019-12-26 RX ORDER — MAGNESIUM OXIDE 400 MG/1
500 TABLET ORAL DAILY
COMMUNITY
End: 2021-06-01

## 2019-12-26 NOTE — PROGRESS NOTES
Botulinum Toxin Injection for Chronic Migraine    Last injection date: N/A (first injection today)   Response: N/A   Complication: none      Procedure: Botulinum toxin injection per PREEMPT protocol  Diagnosis: Chronic Migraine  Performed: [facility]  Performed by: Brandon Taylor MD  Consent Obtained: Yes (including risks, benefits, and alternatives)  The risk include but not limited to neck pain, headache, migraine, slight or partial facial paralysis, eyelid drooping, distant side effects such as breathing problems.  Follow up/Discharge Instructions: Given     Description: The skin was exposed and prepped with alcohol. Injections were done using sterile techniques.  Botox was mixed in presence of the patient with preservative free normal saline in standard dilution 10 units in 0.1 ml and injected as follows:  - Procerus 5 units  -  10 units (5 unit per site, 1 site each side)  - Frontalis 20 units (5 units per site, 2 sites each side)  - Temporalis 40 units (5 units per site, 4 sites each side)  - Occipitalis 30 units (5 units per site, 3 sites each side)  - Cervical paraspinals 20 units (5 units per site, 2 sites each side)  - Trapezius 30 units (5 units per site, 3 sites each side)     Total 155 units used and 45 units wasted.   Patient tolerated procedure well with minimal blood loss.

## 2020-03-24 ENCOUNTER — APPOINTMENT (OUTPATIENT)
Dept: NEUROLOGY | Facility: MEDICAL CENTER | Age: 30
End: 2020-03-24

## 2020-03-24 ENCOUNTER — OFFICE VISIT (OUTPATIENT)
Dept: NEUROLOGY | Facility: MEDICAL CENTER | Age: 30
End: 2020-03-24
Payer: COMMERCIAL

## 2020-03-24 VITALS
HEART RATE: 81 BPM | RESPIRATION RATE: 16 BRPM | SYSTOLIC BLOOD PRESSURE: 142 MMHG | HEIGHT: 71 IN | DIASTOLIC BLOOD PRESSURE: 88 MMHG | WEIGHT: 245.59 LBS | BODY MASS INDEX: 34.38 KG/M2 | TEMPERATURE: 98.8 F

## 2020-03-24 DIAGNOSIS — G43.719 INTRACTABLE CHRONIC MIGRAINE WITHOUT AURA AND WITHOUT STATUS MIGRAINOSUS: Primary | ICD-10-CM

## 2020-03-24 PROCEDURE — 64615 CHEMODENERV MUSC MIGRAINE: CPT | Performed by: PSYCHIATRY & NEUROLOGY

## 2020-03-24 ASSESSMENT — ENCOUNTER SYMPTOMS: HEADACHES: 1

## 2020-03-24 ASSESSMENT — PATIENT HEALTH QUESTIONNAIRE - PHQ9: CLINICAL INTERPRETATION OF PHQ2 SCORE: 0

## 2020-03-24 NOTE — PROGRESS NOTES
"Subjective:      Librado Johnston is a 29 y.o. male who presents for 3-month follow-up, with a history of chronic migraine without aura, for his second series of Botox infusions.    PRAKASH Pavon states that his headaches have responded nicely, he has had more than a 7-day/month reduction in overall headache frequency, he is still having 15 days a month of headache pain.  He has tolerated the Botox infusions the first time around without major issue.  He did notice the headaches beginning to creep back in about a week ago.    Review of the electronic health record indicates when he had first seen Dr. RODRIGUEZ months ago, they have been trying to simplify his medication regimen.  Imitrex 100 mg tablets does work effectively as rescue, Treximet was not covered by his insurance.    Medical, surgical and family histories were reviewed, there are no new drug allergies.  He has Inderal 10 mg to take as needed, more for anxiety, also magnesium oxide, multivitamins, Adderall 5 mg, twice daily, Imitrex 100 mg tablets as needed and Ambien 10 mg nightly as needed.    Review of Systems   Neurological: Positive for headaches.   All other systems reviewed and are negative.       Objective:     /88 (BP Location: Left arm, Patient Position: Sitting, BP Cuff Size: Large adult)   Pulse 81   Temp 37.1 °C (98.8 °F) (Temporal)   Resp 16   Ht 1.803 m (5' 11\")   Wt 111.4 kg (245 lb 9.5 oz)   BMI 34.25 kg/m²      Physical Exam    He appears in no acute distress.  Vital signs are stable.  There is no malar rash.  The neck is supple, range of motion is full.  Cardiac evaluation reveals a regular rhythm.  In quick and cursory fashion, with observation, mental status, cranial nerve, musculoskeletal and coordination evaluations are intact.      As per FDA protocol for chronic migraine, botulinum toxin A was infused using a total of 155 units. A total of 5 units/site was injected at 31 sites, over the procerus, , frontalis, " temporalis, occipitalis, cervical paraspinal and trapezius muscle bodies bilaterally.  He tolerated the procedure well.  Minimal bleeding occurred at several injection sites, controlled with compression.  As per FDA protocol, 45 units was discarded as waste.     Assessment/Plan:     1. Intractable chronic migraine without aura and without status migrainosus  He will follow-up in another 3 months for his next series of injections.  Things seem to be doing well, I would anticipate continuing benefit moving forwards.  Hopefully the wearing-off with headaches increasing prior to his next series does not become problematic.  CGRP medications would be other legitimate treatment options.  He is using his Imitrex appropriately.    - onabotulinum toxin type A SOLR 155 Units    Time: 20-minute spent face-to-face for exam, review, discussion, and education, of this over 60% of the time spent counseling and coordinating care.

## 2020-06-23 ENCOUNTER — OFFICE VISIT (OUTPATIENT)
Dept: NEUROLOGY | Facility: MEDICAL CENTER | Age: 30
End: 2020-06-23
Payer: COMMERCIAL

## 2020-06-23 VITALS
SYSTOLIC BLOOD PRESSURE: 128 MMHG | DIASTOLIC BLOOD PRESSURE: 70 MMHG | WEIGHT: 241.62 LBS | BODY MASS INDEX: 33.83 KG/M2 | OXYGEN SATURATION: 96 % | TEMPERATURE: 98.1 F | HEART RATE: 75 BPM | RESPIRATION RATE: 18 BRPM | HEIGHT: 71 IN

## 2020-06-23 DIAGNOSIS — G43.719 INTRACTABLE CHRONIC MIGRAINE WITHOUT AURA AND WITHOUT STATUS MIGRAINOSUS: ICD-10-CM

## 2020-06-23 PROCEDURE — 64615 CHEMODENERV MUSC MIGRAINE: CPT

## 2020-06-23 RX ORDER — TIZANIDINE 4 MG/1
4 TABLET ORAL 3 TIMES DAILY PRN
Qty: 90 TAB | Refills: 3 | Status: SHIPPED | OUTPATIENT
Start: 2020-06-23 | End: 2020-07-30

## 2020-06-23 RX ORDER — SUMATRIPTAN AND NAPROXEN SODIUM 85; 500 MG/1; MG/1
1 TABLET, FILM COATED ORAL
Qty: 10 TAB | Refills: 3 | Status: SHIPPED | OUTPATIENT
Start: 2020-06-23 | End: 2020-09-22

## 2020-06-25 NOTE — PROGRESS NOTES
Botulinum Toxin Injection for Chronic Migraine    Last injection date: 3/24/2020  Response: good  Complication: none      Procedure: Botulinum toxin injection per PREEMPT protocol  Diagnosis: Chronic Migraine  Performed: [facility]  Performed by: Brandon Taylor MD  Consent Obtained: Yes (including risks, benefits, and alternatives)  The risk include but not limited to neck pain, headache, migraine, slight or partial facial paralysis, eyelid drooping, distant side effects such as breathing problems.  Follow up/Discharge Instructions: Given     Description: The skin was exposed and prepped with alcohol. Injections were done using sterile techniques.  Botox was mixed in presence of the patient with preservative free normal saline in standard dilution 10 units in 0.1 ml and injected as follows:  - Procerus 5 units  -  10 units (5 unit per site, 1 site each side)  - Frontalis 20 units (5 units per site, 2 sites each side)  - Temporalis 40 units (5 units per site, 4 sites each side)  - Occipitalis 30 units (5 units per site, 3 sites each side)  - Cervical paraspinals 20 units (5 units per site, 2 sites each side)  - Trapezius 30 units (5 units per site, 3 sites each side)     Total 155 units used and 45 units wasted.   Patient tolerated procedure well with minimal blood loss.

## 2020-07-08 RX ORDER — RIZATRIPTAN BENZOATE 5 MG/1
1 TABLET, ORALLY DISINTEGRATING ORAL
Qty: 10 TAB | Refills: 3 | Status: SHIPPED | OUTPATIENT
Start: 2020-07-08 | End: 2021-08-31

## 2020-07-30 ENCOUNTER — OFFICE VISIT (OUTPATIENT)
Dept: NEUROLOGY | Facility: MEDICAL CENTER | Age: 30
End: 2020-07-30
Payer: COMMERCIAL

## 2020-07-30 VITALS
OXYGEN SATURATION: 97 % | TEMPERATURE: 97.6 F | BODY MASS INDEX: 33.35 KG/M2 | HEIGHT: 71 IN | WEIGHT: 238.23 LBS | RESPIRATION RATE: 16 BRPM | DIASTOLIC BLOOD PRESSURE: 68 MMHG | SYSTOLIC BLOOD PRESSURE: 122 MMHG | HEART RATE: 75 BPM

## 2020-07-30 DIAGNOSIS — G43.719 INTRACTABLE CHRONIC MIGRAINE WITHOUT AURA AND WITHOUT STATUS MIGRAINOSUS: ICD-10-CM

## 2020-07-30 PROCEDURE — 99215 OFFICE O/P EST HI 40 MIN: CPT | Performed by: NURSE PRACTITIONER

## 2020-07-30 RX ORDER — NAPROXEN SODIUM 550 MG/1
TABLET ORAL
Qty: 45 TAB | Refills: 3 | Status: SHIPPED | OUTPATIENT
Start: 2020-07-30 | End: 2021-08-31

## 2020-07-30 ASSESSMENT — ENCOUNTER SYMPTOMS
FEVER: 0
BRUISES/BLEEDS EASILY: 0
VOMITING: 0
INSOMNIA: 1
DEPRESSION: 0
CHILLS: 0
NERVOUS/ANXIOUS: 1
PALPITATIONS: 0
SHORTNESS OF BREATH: 0
DIARRHEA: 0
DOUBLE VISION: 0
HEADACHES: 1
HEARTBURN: 1
CONSTIPATION: 0
NAUSEA: 0
BLURRED VISION: 0
MYALGIAS: 0
COUGH: 0

## 2020-07-30 NOTE — PROGRESS NOTES
Subjective:    HPI  Librado Johnston is a 30 y.o. male who is here today for follow up for chronic migraine.      He has previously been seen by Dr. Taylor and is getting Botox.  He started getting Botox in 12/2019.  The Botox has reduced the migraines significantly, he now has about 4 a month where they were daily prior to Botox.  He continues to have frequent mild headaches in the back of his head, naproxen and tizanidine help these.        Onset: November 2017.    Triggers:  Odors, allergies, sinus irritation.  Alleviating factors:, naproxen, Botox.  Meds tried and result:  Inderal, sumatriptan, rizatriptan all help, Indocin did not help, amitriptyline  didn't work topamax didn't work  Caffeine use:  20 ounces of coffee 4 days weekly  OTC medications--frequency:  Smoking:  None  Sleep apnea:  No  Family Hx:  Brother and possibly mother with migraines  How many per month:  4  Missed days of school/work:  None   Quality:  Frontal throbbing and pulsating, mild headaches back of head  N&V:  None  Photo/phonophobia:   none  Aura:   none      Review of Systems   Constitutional: Negative for chills and fever.   HENT: Positive for tinnitus. Negative for hearing loss.    Eyes: Negative for blurred vision and double vision.   Respiratory: Negative for cough and shortness of breath.    Cardiovascular: Negative for chest pain and palpitations.   Gastrointestinal: Positive for heartburn. Negative for constipation, diarrhea, nausea and vomiting.   Genitourinary: Negative for dysuria.   Musculoskeletal: Negative for myalgias.   Skin: Negative for rash.   Neurological: Positive for headaches.   Endo/Heme/Allergies: Does not bruise/bleed easily.   Psychiatric/Behavioral: Negative for depression. The patient is nervous/anxious and has insomnia.             Current Outpatient Medications on File Prior to Visit   Medication Sig Dispense Refill   • Rizatriptan Benzoate (MAXALT-MLT) 5 MG TABLET DISPERSIBLE Take 1 Tab by mouth two  "times a week. Take 1 tab at onset of migraine max 2 tabs in 24h and max 10 tabs in one month 10 Tab 3   • propranolol (INDERAL) 10 MG Tab Take 10 mg by mouth 1 time daily as needed.  1   • amphetamine-dextroamphetamine (ADDERALL, 10MG,) 10 MG Tab Take 5 mg by mouth 2 times a day.     • Multiple Vitamins-Minerals (DAILY MULTIVITAMIN PO) Take 1 Tab by mouth every day.     • SUMAtriptan-Naproxen Sodium  MG Tab Take 1 tablet by mouth two times a week. (Patient not taking: Reported on 7/30/2020) 10 Tab 3   • magnesium oxide (MAG-OX) 400 MG Tab Take 500 mg by mouth every day.     • zolpidem (AMBIEN) 10 MG Tab Take 10 mg by mouth at bedtime as needed for Sleep.       No current facility-administered medications on file prior to visit.           Objective:     Encounter Vitals  Standard Vitals  Vitals  Blood Pressure: 122/68  Temperature: 36.4 °C (97.6 °F)  Temp src: Temporal  Pulse: 75  Respiration: 16  Pulse Oximetry: 97 %  Height: 180.3 cm (5' 11\")  Weight: 108.1 kg (238 lb 3.7 oz)  Encounter Vitals  Temperature: 36.4 °C (97.6 °F)  Temp src: Temporal  Blood Pressure: 122/68  Pulse: 75  Respiration: 16  Pulse Oximetry: 97 %  Weight: 108.1 kg (238 lb 3.7 oz)  Height: 180.3 cm (5' 11\")  BMI (Calculated): 33.23        Physical Exam    General:  Alert, no apparent distress,  Psych:   mood and affect WNL  Eyes:     No papilledema noted  Neuro:   Oriented X 4, speech fluent, naming and memory intact                 cranial nerves II-XII intact                 Strength 5/5 BUE/BLE, no drift                  Sensation to PP equal bilaterally                 No limb ataxia with finger to nose and heel to shin                 Ambulates with steady gait.                 Rhomberg negative                Biceps,brachioradialis, tricep, patellar and ankle reflex all 2+    Cardiovascular:    S1S2, no abnormal rhythm auscultated, no peripheral edema  Neck:                     No carotid bruits noted   Pulmonary:            " Respirations easy, lungs clear to auscultation all fields.    Skin:                     No obvious rashes.            Assessment/Plan:       1. Intractable chronic migraine without aura and without status migrainosus    Follow up on 9/22/2020 for Botox    Continue Inderal PRN (takes for anxiety)    Continue Rizatripan and naproxen for episodic relief.  Continue Tizanadine       Start magnesium oxide 400mg by mouth every night, may take extra dose if needed for headache (over the counter), hold for diarrhea         Start Riboflavin (Vitamin B2) 400mg by mouth every night (over the counter),may turn urine bright yellow         Start COQ 10, take 200mg every night. (over the counter)          Attempt to go to bed and get up at the same time every night           Eat meals on regular basis            Stay hydrated.             Aerobic exercise 30 minutes daily             Avoid aged or smoked foods, avoid processed foods, red wine, aged cheese              Keep headache diary, include foods that you may have eaten.             Avoid overusing over the counter medications:  Do not take more than 500mg acetaminophen (tylenol), more than 4 times weekly, more frequent or larger doses are associated with medication overuse headache.     Follow up 9/22/2020 for botox

## 2020-09-22 ENCOUNTER — OFFICE VISIT (OUTPATIENT)
Dept: NEUROLOGY | Facility: MEDICAL CENTER | Age: 30
End: 2020-09-22
Payer: COMMERCIAL

## 2020-09-22 VITALS
DIASTOLIC BLOOD PRESSURE: 64 MMHG | WEIGHT: 248.9 LBS | RESPIRATION RATE: 16 BRPM | HEART RATE: 73 BPM | BODY MASS INDEX: 34.85 KG/M2 | SYSTOLIC BLOOD PRESSURE: 118 MMHG | OXYGEN SATURATION: 98 % | TEMPERATURE: 98.2 F | HEIGHT: 71 IN

## 2020-09-22 DIAGNOSIS — G43.719 INTRACTABLE CHRONIC MIGRAINE WITHOUT AURA AND WITHOUT STATUS MIGRAINOSUS: ICD-10-CM

## 2020-09-22 PROCEDURE — 64615 CHEMODENERV MUSC MIGRAINE: CPT | Performed by: NURSE PRACTITIONER

## 2020-09-22 RX ORDER — TEMAZEPAM 15 MG/1
CAPSULE ORAL
COMMUNITY
Start: 2020-07-20 | End: 2021-06-01

## 2020-09-22 NOTE — PATIENT INSTRUCTIONS
OnabotulinumtoxinA injection (Medical Use)  What is this medicine?  ONABOTULINUMTOXINA (o na POWER you lye num tox in ) is a neuro-muscular blocker. This medicine is used to treat crossed eyes, eyelid spasms, severe neck muscle spasms, ankle and toe muscle spasms, and elbow, wrist, and finger muscle spasms. It is also used to treat excessive underarm sweating, to prevent chronic migraine headaches, and to treat loss of bladder control due to neurologic conditions such as multiple sclerosis or spinal cord injury.  This medicine may be used for other purposes; ask your health care provider or pharmacist if you have questions.  COMMON BRAND NAME(S): Botox  What should I tell my health care provider before I take this medicine?  They need to know if you have any of these conditions:  · breathing problems  · cerebral palsy spasms  · difficulty urinating  · heart problems  · history of surgery where this medicine is going to be used  · infection at the site where this medicine is going to be used  · myasthenia gravis or other neurologic disease  · nerve or muscle disease  · surgery plans  · take medicines that treat or prevent blood clots  · thyroid problems  · an unusual or allergic reaction to botulinum toxin, albumin, other medicines, foods, dyes, or preservatives  · pregnant or trying to get pregnant  · breast-feeding  How should I use this medicine?  This medicine is for injection into a muscle. It is given by a health care professional in a hospital or clinic setting.  Talk to your pediatrician regarding the use of this medicine in children. While this drug may be prescribed for children as young as 2 years old for selected conditions, precautions do apply.  Overdosage: If you think you have taken too much of this medicine contact a poison control center or emergency room at once.  NOTE: This medicine is only for you. Do not share this medicine with others.  What if I miss a dose?  This does not apply.  What may  interact with this medicine?  · aminoglycoside antibiotics like gentamicin, neomycin, tobramycin  · muscle relaxants  · other botulinum toxin injections  This list may not describe all possible interactions. Give your health care provider a list of all the medicines, herbs, non-prescription drugs, or dietary supplements you use. Also tell them if you smoke, drink alcohol, or use illegal drugs. Some items may interact with your medicine.  What should I watch for while using this medicine?  Visit your doctor for regular check ups.  This medicine will cause weakness in the muscle where it is injected. Tell your doctor if you feel unusually weak in other muscles. Get medical help right away if you have problems with breathing, swallowing, or talking.  This medicine might make your eyelids droop or make you see blurry or double. If you have weak muscles or trouble seeing do not drive a car, use machinery, or do other dangerous activities.  This medicine contains albumin from human blood. It may be possible to pass an infection in this medicine, but no cases have been reported. Talk to your doctor about the risks and benefits of this medicine.  If your activities have been limited by your condition, go back to your regular routine slowly after treatment with this medicine.  What side effects may I notice from receiving this medicine?  Side effects that you should report to your doctor or health care professional as soon as possible:  · allergic reactions like skin rash, itching or hives, swelling of the face, lips, or tongue  · breathing problems  · changes in vision  · chest pain or tightness  · eye irritation, pain  · fast, irregular heartbeat  · infection  · numbness  · speech problems  · swallowing problems  · unusual weakness  Side effects that usually do not require medical attention (report to your doctor or health care professional if they continue or are bothersome):  · bruising or pain at site where  injected  · drooping eyelid  · dry eyes or mouth  · headache  · muscles aches, pains  · sensitivity to light  · tearing  This list may not describe all possible side effects. Call your doctor for medical advice about side effects. You may report side effects to FDA at 0-899-OEP-3911.  Where should I keep my medicine?  This drug is given in a hospital or clinic and will not be stored at home.  NOTE: This sheet is a summary. It may not cover all possible information. If you have questions about this medicine, talk to your doctor, pharmacist, or health care provider.  © 2020 [employer] (2019-06-24 14:21:42)

## 2020-09-22 NOTE — PROGRESS NOTES
Subjective:    HPI    Patient is here today for Botox for chronic migraine.  He began getting Botox for chronic migraine in 12/2019, it has reduced his migraines from every day to approximately 4 pr month.  He continues to have minor headaches in the posterior portion of his head which are treated with naproxen and tizanidine.    Onset: November 2017.    Triggers:  Odors, allergies, sinus irritation.  Alleviating factors:, naproxen, Botox.  Meds tried and result:  Inderal, sumatriptan, rizatriptan all help, Indocin did not help, amitriptyline  didn't work topamax didn't work  Caffeine use:  20 ounces of coffee 4 days weekly  OTC medications--frequency:  Smoking:  None  Sleep apnea:  No  Family Hx:  Brother and possibly mother with migraines  How many per month:  4  Missed days of school/work:  None   Quality:  Frontal throbbing and pulsating, mild headaches back of head  N&V:  None  Photo/phonophobia:   none  Aura:   none    Current Outpatient Medications on File Prior to Visit   Medication Sig Dispense Refill   • naproxen sodium (ANAPROX DS) 550 MG tablet 1 tab headache onset with Imitrex; repeat in 1 hour prn. 45 Tab 3   • Rizatriptan Benzoate (MAXALT-MLT) 5 MG TABLET DISPERSIBLE Take 1 Tab by mouth two times a week. Take 1 tab at onset of migraine max 2 tabs in 24h and max 10 tabs in one month 10 Tab 3   • propranolol (INDERAL) 10 MG Tab Take 10 mg by mouth 1 time daily as needed.  1   • amphetamine-dextroamphetamine (ADDERALL, 10MG,) 10 MG Tab Take 5 mg by mouth 2 times a day.     • Multiple Vitamins-Minerals (DAILY MULTIVITAMIN PO) Take 1 Tab by mouth every day.     • temazepam (RESTORIL) 15 MG Cap TAKE 1 CAPSULE BY MOUTH EVERYDAY AT BEDTIME*F51.01 DS/30     • magnesium oxide (MAG-OX) 400 MG Tab Take 500 mg by mouth every day.       No current facility-administered medications on file prior to visit.      Past Medical History:   Diagnosis Date   • ADD (attention deficit disorder)    • Anxiety    • Environmental  "and seasonal allergies    • Hypertension    • Snoring     no sleep study          Objective:     Encounter Vitals  Standard Vitals  Vitals  Blood Pressure: 118/64  Temperature: 36.8 °C (98.2 °F)  Temp src: Temporal  Pulse: 73  Respiration: 16  Pulse Oximetry: 98 %  Height: 180.3 cm (5' 11\")  Weight: 112.9 kg (248 lb 14.4 oz)  Encounter Vitals  Temperature: 36.8 °C (98.2 °F)  Temp src: Temporal  Blood Pressure: 118/64  Pulse: 73  Respiration: 16  Pulse Oximetry: 98 %  Weight: 112.9 kg (248 lb 14.4 oz)  Height: 180.3 cm (5' 11\")  BMI (Calculated): 34.71                 Assessment/Plan:     1. Intractable chronic migraine without aura and without status migrainosus      Chronic Migraine:  Botox therapy has reduced patient’s migraines by more than 7 days and/or 100 hours per month.     I treated this patient in clinic today with BotoxA 155 units according to the dosing/injection paradigm currently mandated by the FDA for the management of chronic migraine. Specifically, I injected 5 units to the procerus, 5 units to the corrugators bilaterally, a total of 20 units to the frontalis musculature, 20 units to the temporalis bilaterally, 15 units to the occipitalis bilaterally, 10 units to the cervical paraspinals bilaterally and 15 units to the trapezius musculature bilaterally. The remainder of the Botox 200 units mixed but not administered was discarded as wastage per FDA guidelines.     Education/counseling/reduction in medications if pt has medication overuse headache; Frequency of headaches is >15 days monthly with at least 8 migraines monthly; Migraines include at least two of the following: worsened with activity or avoidance of activity with migraines (ie they go lie down), moderate to severe pain intensity, pulsing headache, unilateral headache AND they have either nausea or vomiting OR sensitivity to light and sound.     Although this patient is responding to botox they are NOT migraine free, however, and it is my " recommendation Botox be continued at this time.     This patient has chronic daily migraines, defined as having 15 or more headaches days per month, 8 of which are migraines, over a minimum of the last three months. Episodes last more than 4 hours (untreated).  Pt has 2 or more of following (see initial note) -  Headache worsened with activity, pain is moderate to severe intensity, pulsing in nature, unilateral and patient either has nausea/vomiting OR sensitivity to light and sound.  This patient does/does not also have medication overuse headache.  However our plan to address this includes education, occipital nerve shots, restricting use as directed.        No adverse effects of botox noted in office today.    RTC 12/29/2020 for Botox.

## 2020-11-24 DIAGNOSIS — G43.709 CHRONIC MIGRAINE W/O AURA W/O STATUS MIGRAINOSUS, NOT INTRACTABLE: ICD-10-CM

## 2021-01-19 ENCOUNTER — OFFICE VISIT (OUTPATIENT)
Dept: NEUROLOGY | Facility: MEDICAL CENTER | Age: 31
End: 2021-01-19
Attending: NURSE PRACTITIONER
Payer: COMMERCIAL

## 2021-01-19 VITALS
DIASTOLIC BLOOD PRESSURE: 64 MMHG | BODY MASS INDEX: 35.22 KG/M2 | RESPIRATION RATE: 14 BRPM | SYSTOLIC BLOOD PRESSURE: 124 MMHG | HEIGHT: 71 IN | HEART RATE: 76 BPM | OXYGEN SATURATION: 98 % | WEIGHT: 251.54 LBS | TEMPERATURE: 97.6 F

## 2021-01-19 DIAGNOSIS — G43.719 INTRACTABLE CHRONIC MIGRAINE WITHOUT AURA AND WITHOUT STATUS MIGRAINOSUS: ICD-10-CM

## 2021-01-19 PROCEDURE — 64615 CHEMODENERV MUSC MIGRAINE: CPT | Performed by: NURSE PRACTITIONER

## 2021-01-19 RX ORDER — DICLOFENAC SODIUM 75 MG/1
75 TABLET, DELAYED RELEASE ORAL
Qty: 20 TAB | Refills: 11 | Status: SHIPPED | OUTPATIENT
Start: 2021-01-19 | End: 2022-01-19

## 2021-01-19 ASSESSMENT — PATIENT HEALTH QUESTIONNAIRE - PHQ9: CLINICAL INTERPRETATION OF PHQ2 SCORE: 0

## 2021-01-19 NOTE — PROGRESS NOTES
"Subjective:        HPI     Librado Johnston is a 30 y.o. male who  is here today for Botox for chronic migraine.  He began getting Botox for chronic migraine in 12/2019, it has reduced his migraines from every day to approximately 4 pr month.  He continues to have minor headaches in the posterior portion of his head which are treated with naproxen and tizanidine.  He does not feel like the naproxen is working as well as it used to.  He takes it less than once per week.   Rizatriptan doesn't always work for the severe migraines.      Onset: November 2017.    Triggers:  Odors, allergies, sinus irritation.  Alleviating factors:, naproxen, Botox.  Meds tried and result:  Inderal, sumatriptan, rizatriptan all help, Indocin did not help, amitriptyline  didn't work topamax didn't work  Caffeine use:  20 ounces of coffee 4 days weekly  OTC medications--frequency:none   Smoking:  None  Sleep apnea:  No  Family Hx:  Brother and possibly mother with migraines  How many per month:  4  Missed days of school/work:  None   Quality:  Frontal throbbing and pulsating, mild headaches back of head  N&V:  None  Photo/phonophobia:   none  Aura:   none          Objective:       Encounter Vitals  Standard Vitals  Vitals  Blood Pressure: 124/64  Temperature: 36.4 °C (97.6 °F)  Temp src: Temporal  Pulse: 76  Respiration: 14  Pulse Oximetry: 98 %  Height: 180.3 cm (5' 11\")  Weight: 114.1 kg (251 lb 8.7 oz)  Encounter Vitals  Temperature: 36.4 °C (97.6 °F)  Temp src: Temporal  Blood Pressure: 124/64  Pulse: 76  Respiration: 14  Pulse Oximetry: 98 %  Weight: 114.1 kg (251 lb 8.7 oz)  Height: 180.3 cm (5' 11\")  BMI (Calculated): 35.08         Assessment/Plan:   1. Intractable chronic migraine without aura and without status migrainosus    Start diclofenac 75mg once daily PRN, for moderate headaches    May take Nurtec 75mg 1 PO daily PRN for severe migraines that are not responsive to rizatriptan.    Botox 155 units      Chronic Migraine:  Botox " therapy has reduced Mr. Johnston's migraines by more than 7 days and/or 100 hours per month.     I treated him in clinic today with BotoxA 155 units according to the dosing/injection paradigm currently mandated by the FDA for the management of chronic migraine. Specifically, I injected 5 units to the procerus, 5 units to the corrugators bilaterally, a total of 20 units to the frontalis musculature, 20 units to the temporalis bilaterally, 15 units to the occipitalis bilaterally, 10 units to the cervical paraspinals bilaterally and 15 units to the trapezius musculature bilaterally. The remainder of the Botox 200 units mixed but not administered was discarded as wastage per FDA guidelines.   ; Frequency of headaches prior to botox is >15 days monthly with at least 8 migraines monthly; Migraines include at least two of the following: worsened with activity or avoidance of activity with migraines (ie they go lie down), moderate to severe pain intensity, pulsing headache, unilateral headache AND they have either nausea or vomiting OR sensitivity to light and sound.     Although he  is responding to botox he is  NOT migraine free, however, and it is my recommendation Botox be continued at this time.     This patient has chronic  migraines, defined as having 15 or more headaches days per month, 8 of which are migraines, over a minimum of the last three months. Episodes last more than 4 hours (untreated).  Pt has 2 or more of following (see initial note) -  Headache worsened with activity, pain is moderate to severe intensity, pulsing in nature, unilateral and patient either has nausea/vomiting OR sensitivity to light and sound.  He does not have medication overuse headache.      No adverse effects of botox noted at conclusion of visit today.

## 2021-01-19 NOTE — PATIENT INSTRUCTIONS
OnabotulinumtoxinA injection (Medical Use)  What is this medicine?  ONABOTULINUMTOXINA (o na POWER you lye num tox in ) is a neuro-muscular blocker. This medicine is used to treat crossed eyes, eyelid spasms, severe neck muscle spasms, ankle and toe muscle spasms, and elbow, wrist, and finger muscle spasms. It is also used to treat excessive underarm sweating, to prevent chronic migraine headaches, and to treat loss of bladder control due to neurologic conditions such as multiple sclerosis or spinal cord injury.  This medicine may be used for other purposes; ask your health care provider or pharmacist if you have questions.  COMMON BRAND NAME(S): Botox  What should I tell my health care provider before I take this medicine?  They need to know if you have any of these conditions:  · breathing problems  · cerebral palsy spasms  · difficulty urinating  · heart problems  · history of surgery where this medicine is going to be used  · infection at the site where this medicine is going to be used  · myasthenia gravis or other neurologic disease  · nerve or muscle disease  · surgery plans  · take medicines that treat or prevent blood clots  · thyroid problems  · an unusual or allergic reaction to botulinum toxin, albumin, other medicines, foods, dyes, or preservatives  · pregnant or trying to get pregnant  · breast-feeding  How should I use this medicine?  This medicine is for injection into a muscle. It is given by a health care professional in a hospital or clinic setting.  Talk to your pediatrician regarding the use of this medicine in children. While this drug may be prescribed for children as young as 2 years old for selected conditions, precautions do apply.  Overdosage: If you think you have taken too much of this medicine contact a poison control center or emergency room at once.  NOTE: This medicine is only for you. Do not share this medicine with others.  What if I miss a dose?  This does not apply.  What may  interact with this medicine?  · aminoglycoside antibiotics like gentamicin, neomycin, tobramycin  · muscle relaxants  · other botulinum toxin injections  This list may not describe all possible interactions. Give your health care provider a list of all the medicines, herbs, non-prescription drugs, or dietary supplements you use. Also tell them if you smoke, drink alcohol, or use illegal drugs. Some items may interact with your medicine.  What should I watch for while using this medicine?  Visit your doctor for regular check ups.  This medicine will cause weakness in the muscle where it is injected. Tell your doctor if you feel unusually weak in other muscles. Get medical help right away if you have problems with breathing, swallowing, or talking.  This medicine might make your eyelids droop or make you see blurry or double. If you have weak muscles or trouble seeing do not drive a car, use machinery, or do other dangerous activities.  This medicine contains albumin from human blood. It may be possible to pass an infection in this medicine, but no cases have been reported. Talk to your doctor about the risks and benefits of this medicine.  If your activities have been limited by your condition, go back to your regular routine slowly after treatment with this medicine.  What side effects may I notice from receiving this medicine?  Side effects that you should report to your doctor or health care professional as soon as possible:  · allergic reactions like skin rash, itching or hives, swelling of the face, lips, or tongue  · breathing problems  · changes in vision  · chest pain or tightness  · eye irritation, pain  · fast, irregular heartbeat  · infection  · numbness  · speech problems  · swallowing problems  · unusual weakness  Side effects that usually do not require medical attention (report to your doctor or health care professional if they continue or are bothersome):  · bruising or pain at site where  injected  · drooping eyelid  · dry eyes or mouth  · headache  · muscles aches, pains  · sensitivity to light  · tearing  This list may not describe all possible side effects. Call your doctor for medical advice about side effects. You may report side effects to FDA at 6-243-JBB-7721.  Where should I keep my medicine?  This drug is given in a hospital or clinic and will not be stored at home.  NOTE: This sheet is a summary. It may not cover all possible information. If you have questions about this medicine, talk to your doctor, pharmacist, or health care provider.  © 2020 [employer] (2019-06-24 14:21:42)

## 2021-03-29 DIAGNOSIS — G43.709 CHRONIC MIGRAINE W/O AURA W/O STATUS MIGRAINOSUS, NOT INTRACTABLE: ICD-10-CM

## 2021-06-01 ENCOUNTER — OFFICE VISIT (OUTPATIENT)
Dept: NEUROLOGY | Facility: MEDICAL CENTER | Age: 31
End: 2021-06-01
Attending: NURSE PRACTITIONER
Payer: COMMERCIAL

## 2021-06-01 VITALS
SYSTOLIC BLOOD PRESSURE: 126 MMHG | BODY MASS INDEX: 35.8 KG/M2 | HEIGHT: 71 IN | TEMPERATURE: 97.7 F | DIASTOLIC BLOOD PRESSURE: 76 MMHG | WEIGHT: 255.73 LBS | OXYGEN SATURATION: 98 % | HEART RATE: 77 BPM

## 2021-06-01 DIAGNOSIS — G43.709 CHRONIC MIGRAINE W/O AURA W/O STATUS MIGRAINOSUS, NOT INTRACTABLE: ICD-10-CM

## 2021-06-01 PROCEDURE — 64615 CHEMODENERV MUSC MIGRAINE: CPT | Performed by: NURSE PRACTITIONER

## 2021-06-01 PROCEDURE — 700111 HCHG RX REV CODE 636 W/ 250 OVERRIDE (IP): Performed by: NURSE PRACTITIONER

## 2021-06-01 RX ORDER — CYCLOBENZAPRINE HCL 10 MG
10 TABLET ORAL 2 TIMES DAILY PRN
Qty: 20 TABLET | Refills: 11 | Status: SHIPPED | OUTPATIENT
Start: 2021-06-01 | End: 2022-03-01

## 2021-06-01 RX ADMIN — ONABOTULINUMTOXINA 155 UNITS: 200 INJECTION, POWDER, LYOPHILIZED, FOR SOLUTION INTRADERMAL; INTRAMUSCULAR at 07:33

## 2021-06-01 NOTE — PROGRESS NOTES
"Subjective:      Librado Johnston is a 30 y.o. male who presents for Botox for chronic migraine.    HPI     Librado Johnston is a 30 y.o. male who  is here today for Botox for chronic migraine.  He began getting Botox for chronic migraine in 12/2019, it has reduced his migraines from every day to approximately 4 pr month.  He continues to have minor headaches in the posterior portion of his head which are treated with naproxen and tizanidine.    He takes it less than once per week.   Rizatriptan doesn't always work for the severe migraines.     His last Botox was in 1/2021, he missed the April. He has been getting a headache every work day around 4pm, incidentally, he drinks coffee on those mornings. He has had an increase in the headaches, since he missed the Botox appointment in April. He still gets headaches that are annoying in the back of his head when he is sitting, they are better when he is moving around.  He does not feel that the Tizanidine is not working.      Onset: November 2017.    Triggers:  Odors, allergies, sinus irritation.  Alleviating factors:, naproxen, Botox.  Meds tried and result:  Inderal, sumatriptan, rizatriptan all help, Indocin did not help, amitriptyline  didn't work topamax didn't work  Caffeine use:  20 ounces of coffee 4 days weekly  OTC medications--frequency:none   Smoking:  None  Sleep apnea:  No  Family Hx:  Brother and possibly mother with migraines  How many per month:   16  Missed days of school/work:  None   Quality:  Frontal throbbing and pulsating, mild headaches back of head  N&V:  None  Photo/phonophobia:   none  Aura:   none       Objective:     /76 (BP Location: Right arm, Patient Position: Sitting)   Pulse 77   Temp 36.5 °C (97.7 °F) (Temporal)   Ht 1.803 m (5' 11\")   Wt 116 kg (255 lb 11.7 oz)   SpO2 98%   BMI 35.67 kg/m²           Assessment/Plan:     1. Chronic migraine w/o aura w/o status migrainosus, not intractable    - onabotulinum toxin A (Botox) " injection 155 Units      Stop tizandine as it has not been helpful, start cyclobenzaprine 0.5-1 tablet PO BID PRN,      Chronic Migraine:  Botox therapy has reduced patient’s migraines by more than 7 days and/or 100 hours per month.     I treated him in clinic today with BotoxA 155 units according to the dosing/injection paradigm currently mandated by the FDA for the management of chronic migraine. Specifically, I injected 5 units to the procerus, 5 units to the corrugators bilaterally, a total of 20 units to the frontalis musculature, 20 units to the temporalis bilaterally, 15 units to the occipitalis bilaterally, 10 units to the cervical paraspinals bilaterally and 15 units to the trapezius musculature bilaterally. The remainder of the Botox 200 units mixed but not administered was discarded as wastage per FDA guidelines  Consent on file.      Frequency of headaches is >15 days monthly with at least 8 migraines monthly; Migraines include at least two of the following: worsened with activity or avoidance of activity with migraines (ie they go lie down), moderate to severe pain intensity, pulsing headache, unilateral headache and has  have either nausea or vomiting OR sensitivity to light and sound.     Although he is responding to botox he is NOT migraine free, however, and it is my recommendation Botox be continued at this time.    He has chronic migraines, defined as having 15 or more headaches days per month, 8 of which are migraines, over a minimum of the last three months. Episodes last more than 4 hours (untreated).  Pt has 2 or more of following (see initial note) -  Headache worsened with activity, pain is moderate to severe intensity, pulsing in nature, unilateral and patient either has nausea/vomiting OR sensitivity to light and sound.    He is not also have medication overuse headache.        No adverse effect of Botox noted at conclusion of today's appointment.    Follow up 8/31/2021  for Botox or  sooner if needed.

## 2021-06-01 NOTE — PATIENT INSTRUCTIONS
OnabotulinumtoxinA injection (Medical Use)  What is this medicine?  ONABOTULINUMTOXINA (o na POWER you lye num tox in ) is a neuro-muscular blocker. This medicine is used to treat crossed eyes, eyelid spasms, severe neck muscle spasms, ankle and toe muscle spasms, and elbow, wrist, and finger muscle spasms. It is also used to treat excessive underarm sweating, to prevent chronic migraine headaches, and to treat loss of bladder control due to neurologic conditions such as multiple sclerosis or spinal cord injury.  This medicine may be used for other purposes; ask your health care provider or pharmacist if you have questions.  COMMON BRAND NAME(S): Botox  What should I tell my health care provider before I take this medicine?  They need to know if you have any of these conditions:  · breathing problems  · cerebral palsy spasms  · difficulty urinating  · heart problems  · history of surgery where this medicine is going to be used  · infection at the site where this medicine is going to be used  · myasthenia gravis or other neurologic disease  · nerve or muscle disease  · surgery plans  · take medicines that treat or prevent blood clots  · thyroid problems  · an unusual or allergic reaction to botulinum toxin, albumin, other medicines, foods, dyes, or preservatives  · pregnant or trying to get pregnant  · breast-feeding  How should I use this medicine?  This medicine is for injection into a muscle. It is given by a health care professional in a hospital or clinic setting.  Talk to your pediatrician regarding the use of this medicine in children. While this drug may be prescribed for children as young as 2 years old for selected conditions, precautions do apply.  Overdosage: If you think you have taken too much of this medicine contact a poison control center or emergency room at once.  NOTE: This medicine is only for you. Do not share this medicine with others.  What if I miss a dose?  This does not apply.  What may  interact with this medicine?  · aminoglycoside antibiotics like gentamicin, neomycin, tobramycin  · muscle relaxants  · other botulinum toxin injections  This list may not describe all possible interactions. Give your health care provider a list of all the medicines, herbs, non-prescription drugs, or dietary supplements you use. Also tell them if you smoke, drink alcohol, or use illegal drugs. Some items may interact with your medicine.  What should I watch for while using this medicine?  Visit your doctor for regular check ups.  This medicine will cause weakness in the muscle where it is injected. Tell your doctor if you feel unusually weak in other muscles. Get medical help right away if you have problems with breathing, swallowing, or talking.  This medicine might make your eyelids droop or make you see blurry or double. If you have weak muscles or trouble seeing do not drive a car, use machinery, or do other dangerous activities.  This medicine contains albumin from human blood. It may be possible to pass an infection in this medicine, but no cases have been reported. Talk to your doctor about the risks and benefits of this medicine.  If your activities have been limited by your condition, go back to your regular routine slowly after treatment with this medicine.  What side effects may I notice from receiving this medicine?  Side effects that you should report to your doctor or health care professional as soon as possible:  · allergic reactions like skin rash, itching or hives, swelling of the face, lips, or tongue  · breathing problems  · changes in vision  · chest pain or tightness  · eye irritation, pain  · fast, irregular heartbeat  · infection  · numbness  · speech problems  · swallowing problems  · unusual weakness  Side effects that usually do not require medical attention (report to your doctor or health care professional if they continue or are bothersome):  · bruising or pain at site where  injected  · drooping eyelid  · dry eyes or mouth  · headache  · muscles aches, pains  · sensitivity to light  · tearing  This list may not describe all possible side effects. Call your doctor for medical advice about side effects. You may report side effects to FDA at 9-199-KDB-3531.  Where should I keep my medicine?  This drug is given in a hospital or clinic and will not be stored at home.  NOTE: This sheet is a summary. It may not cover all possible information. If you have questions about this medicine, talk to your doctor, pharmacist, or health care provider.  © 2020 [employer] (2019-06-24 14:21:42)

## 2021-06-30 ENCOUNTER — OFFICE VISIT (OUTPATIENT)
Dept: URGENT CARE | Facility: CLINIC | Age: 31
End: 2021-06-30
Payer: COMMERCIAL

## 2021-06-30 VITALS
SYSTOLIC BLOOD PRESSURE: 118 MMHG | HEIGHT: 71 IN | WEIGHT: 245 LBS | BODY MASS INDEX: 34.3 KG/M2 | HEART RATE: 74 BPM | OXYGEN SATURATION: 99 % | TEMPERATURE: 98.1 F | DIASTOLIC BLOOD PRESSURE: 60 MMHG | RESPIRATION RATE: 18 BRPM

## 2021-06-30 DIAGNOSIS — H69.92 DYSFUNCTION OF LEFT EUSTACHIAN TUBE: ICD-10-CM

## 2021-06-30 DIAGNOSIS — H92.02 OTALGIA, LEFT EAR: ICD-10-CM

## 2021-06-30 PROCEDURE — 99202 OFFICE O/P NEW SF 15 MIN: CPT | Performed by: NURSE PRACTITIONER

## 2021-06-30 ASSESSMENT — ENCOUNTER SYMPTOMS
COUGH: 0
RHINORRHEA: 1

## 2021-06-30 NOTE — PROGRESS NOTES
Subjective:      Librado Johnston is a 30 y.o. male who presents with Otalgia (LEFT x5 days ) and Sinus Problem            Otalgia   There is pain in the left ear. This is a new problem. Episode onset: pt reports new onset of left ear pain that started 4 days ago. now instead of pressure it feels like an ache. [place] +seasonal allergies. No recent URI. The problem occurs constantly. The problem has been gradually worsening. There has been no fever. Associated symptoms include rhinorrhea. Pertinent negatives include no coughing or ear discharge. He has tried NSAIDs for the symptoms. The treatment provided no relief.       Review of Systems   HENT: Positive for congestion, ear pain and rhinorrhea. Negative for ear discharge.    Respiratory: Negative for cough.    All other systems reviewed and are negative.    Past Medical History:   Diagnosis Date   • ADD (attention deficit disorder)    • Anxiety    • Environmental and seasonal allergies    • Hypertension    • Snoring     no sleep study      Past Surgical History:   Procedure Laterality Date   • TURBINATE REDUCTION Bilateral 9/4/2018    Procedure: TURBINATE REDUCTION - SUBMUCOSAL APPROACH;  Surgeon: Alexis Syed M.D.;  Location: SURGERY SAME DAY [facility];  Service: Ent   • SEPTOPLASTY Bilateral 9/4/2018    Procedure: SEPTOPLASTY;  Surgeon: Alexis Syed M.D.;  Location: SURGERY SAME DAY [facility];  Service: Ent      Social History     Socioeconomic History   • Marital status: Single     Spouse name: Not on file   • Number of children: Not on file   • Years of education: Not on file   • Highest education level: Not on file   Occupational History   • Not on file   Tobacco Use   • Smoking status: Never Smoker   • Smokeless tobacco: Never Used   Vaping Use   • Vaping Use: Never used   Substance and Sexual Activity   • Alcohol use: Yes     Comment: social    • Drug use: No   • Sexual activity: Yes     Partners: Male   Other Topics  "Concern   • Not on file   Social History Narrative   • Not on file     Social Determinants of Health     Financial Resource Strain:    • Difficulty of Paying Living Expenses:    Food Insecurity:    • Worried About Running Out of Food in the Last Year:    • Ran Out of Food in the Last Year:    Transportation Needs:    • Lack of Transportation (Medical):    • Lack of Transportation (Non-Medical):    Physical Activity:    • Days of Exercise per Week:    • Minutes of Exercise per Session:    Stress:    • Feeling of Stress :    Social Connections:    • Frequency of Communication with Friends and Family:    • Frequency of Social Gatherings with Friends and Family:    • Attends Voodoo Services:    • Active Member of Clubs or Organizations:    • Attends Club or Organization Meetings:    • Marital Status:    Intimate Partner Violence:    • Fear of Current or Ex-Partner:    • Emotionally Abused:    • Physically Abused:    • Sexually Abused:           Objective:     /60   Pulse 74   Temp 36.7 °C (98.1 °F) (Temporal)   Resp 18   Ht 1.803 m (5' 11\")   Wt 111 kg (245 lb)   SpO2 99%   BMI 34.17 kg/m²      Physical Exam  Vitals and nursing note reviewed.   Constitutional:       Appearance: Normal appearance.   HENT:      Head: Normocephalic and atraumatic.      Right Ear: Tympanic membrane and external ear normal.      Left Ear: Tympanic membrane and external ear normal. No tenderness.  No middle ear effusion. Tympanic membrane is not erythematous.      Nose: Nose normal.      Mouth/Throat:      Mouth: Mucous membranes are moist.      Pharynx: Oropharynx is clear.   Eyes:      Extraocular Movements: Extraocular movements intact.      Pupils: Pupils are equal, round, and reactive to light.   Cardiovascular:      Rate and Rhythm: Normal rate and regular rhythm.   Pulmonary:      Effort: Pulmonary effort is normal.   Musculoskeletal:         General: Normal range of motion.      Cervical back: Normal range of motion " and neck supple.   Skin:     General: Skin is warm and dry.      Capillary Refill: Capillary refill takes less than 2 seconds.   Neurological:      General: No focal deficit present.      Mental Status: He is alert and oriented to person, place, and time. Mental status is at baseline.   Psychiatric:         Mood and Affect: Mood normal.         Thought Content: Thought content normal.         Judgment: Judgment normal.                        Assessment/Plan:        1. Otalgia, left ear    2. Dysfunction of left eustachian tube    OTC non drowsy antihistamine daily  OTC nasocort one spray each nostril BID for one week  OTC sudafed as needed for increased ear pressure/congestion  May use nasal saline rinses daily  RTC if ear pain continues to get worse  Supportive care, differential diagnoses, and indications for immediate follow-up discussed with patient.    Pathogenesis of diagnosis discussed including typical length and natural progression.      Instructed to return to  or nearest emergency department if symptoms fail to improve, for any change in condition, further concerns, or new concerning symptoms.  Patient states understanding of the plan of care and discharge instructions.

## 2021-08-31 ENCOUNTER — OFFICE VISIT (OUTPATIENT)
Dept: NEUROLOGY | Facility: MEDICAL CENTER | Age: 31
End: 2021-08-31
Attending: NURSE PRACTITIONER
Payer: COMMERCIAL

## 2021-08-31 VITALS
BODY MASS INDEX: 36.12 KG/M2 | HEART RATE: 79 BPM | RESPIRATION RATE: 14 BRPM | DIASTOLIC BLOOD PRESSURE: 66 MMHG | TEMPERATURE: 97.3 F | OXYGEN SATURATION: 97 % | WEIGHT: 258 LBS | HEIGHT: 71 IN | SYSTOLIC BLOOD PRESSURE: 114 MMHG

## 2021-08-31 DIAGNOSIS — G43.709 CHRONIC MIGRAINE W/O AURA W/O STATUS MIGRAINOSUS, NOT INTRACTABLE: ICD-10-CM

## 2021-08-31 PROCEDURE — 64615 CHEMODENERV MUSC MIGRAINE: CPT | Performed by: NURSE PRACTITIONER

## 2021-08-31 PROCEDURE — 700111 HCHG RX REV CODE 636 W/ 250 OVERRIDE (IP): Performed by: NURSE PRACTITIONER

## 2021-08-31 RX ORDER — RIZATRIPTAN BENZOATE 5 MG/1
1 TABLET, ORALLY DISINTEGRATING ORAL 2 TIMES DAILY PRN
Qty: 9 TABLET | Refills: 11 | Status: SHIPPED | OUTPATIENT
Start: 2021-08-31 | End: 2022-03-01

## 2021-08-31 RX ADMIN — ONABOTULINUMTOXINA 155 UNITS: 200 INJECTION, POWDER, LYOPHILIZED, FOR SOLUTION INTRADERMAL; INTRAMUSCULAR at 07:50

## 2021-08-31 NOTE — PATIENT INSTRUCTIONS
OnabotulinumtoxinA injection (Medical Use)  What is this medicine?  ONABOTULINUMTOXINA (o na POWER you lye num tox in ) is a neuro-muscular blocker. This medicine is used to treat crossed eyes, eyelid spasms, severe neck muscle spasms, ankle and toe muscle spasms, and elbow, wrist, and finger muscle spasms. It is also used to treat excessive underarm sweating, to prevent chronic migraine headaches, and to treat loss of bladder control due to neurologic conditions such as multiple sclerosis or spinal cord injury.  This medicine may be used for other purposes; ask your health care provider or pharmacist if you have questions.  COMMON BRAND NAME(S): Botox  What should I tell my health care provider before I take this medicine?  They need to know if you have any of these conditions:  · breathing problems  · cerebral palsy spasms  · difficulty urinating  · heart problems  · history of surgery where this medicine is going to be used  · infection at the site where this medicine is going to be used  · myasthenia gravis or other neurologic disease  · nerve or muscle disease  · surgery plans  · take medicines that treat or prevent blood clots  · thyroid problems  · an unusual or allergic reaction to botulinum toxin, albumin, other medicines, foods, dyes, or preservatives  · pregnant or trying to get pregnant  · breast-feeding  How should I use this medicine?  This medicine is for injection into a muscle. It is given by a health care professional in a hospital or clinic setting.  Talk to your pediatrician regarding the use of this medicine in children. While this drug may be prescribed for children as young as 2 years old for selected conditions, precautions do apply.  Overdosage: If you think you have taken too much of this medicine contact a poison control center or emergency room at once.  NOTE: This medicine is only for you. Do not share this medicine with others.  What if I miss a dose?  This does not apply.  What may  interact with this medicine?  · aminoglycoside antibiotics like gentamicin, neomycin, tobramycin  · muscle relaxants  · other botulinum toxin injections  This list may not describe all possible interactions. Give your health care provider a list of all the medicines, herbs, non-prescription drugs, or dietary supplements you use. Also tell them if you smoke, drink alcohol, or use illegal drugs. Some items may interact with your medicine.  What should I watch for while using this medicine?  Visit your doctor for regular check ups.  This medicine will cause weakness in the muscle where it is injected. Tell your doctor if you feel unusually weak in other muscles. Get medical help right away if you have problems with breathing, swallowing, or talking.  This medicine might make your eyelids droop or make you see blurry or double. If you have weak muscles or trouble seeing do not drive a car, use machinery, or do other dangerous activities.  This medicine contains albumin from human blood. It may be possible to pass an infection in this medicine, but no cases have been reported. Talk to your doctor about the risks and benefits of this medicine.  If your activities have been limited by your condition, go back to your regular routine slowly after treatment with this medicine.  What side effects may I notice from receiving this medicine?  Side effects that you should report to your doctor or health care professional as soon as possible:  · allergic reactions like skin rash, itching or hives, swelling of the face, lips, or tongue  · breathing problems  · changes in vision  · chest pain or tightness  · eye irritation, pain  · fast, irregular heartbeat  · infection  · numbness  · speech problems  · swallowing problems  · unusual weakness  Side effects that usually do not require medical attention (report to your doctor or health care professional if they continue or are bothersome):  · bruising or pain at site where  injected  · drooping eyelid  · dry eyes or mouth  · headache  · muscles aches, pains  · sensitivity to light  · tearing  This list may not describe all possible side effects. Call your doctor for medical advice about side effects. You may report side effects to FDA at 2-495-XZC-7155.  Where should I keep my medicine?  This drug is given in a hospital or clinic and will not be stored at home.  NOTE: This sheet is a summary. It may not cover all possible information. If you have questions about this medicine, talk to your doctor, pharmacist, or health care provider.  © 2020 [employer] (2019-06-24 14:21:42)

## 2021-08-31 NOTE — PROGRESS NOTES
"Subjective     HPI     Librado Johnston is a 31 y.o. male who  is here today for Botox for chronic migraine.  He began getting Botox for chronic migraine in 12/2019, it has reduced his migraines from every day to approximately 4 pr month.  He continues to have minor headaches in the posterior portion of his head which are treated with naproxen and tizanidine.    He takes it less than once per week.   Rizatriptan doesn't always work for the severe migraines.      His last Botox was 6/1/2021 incidentally, he drinks coffee on those mornings.     He has had an increase in the headaches, since he missed the Botox appointment in April. He still gets headaches that are annoying in the back of his head when he is sitting, they are better when he is moving around.  He does not feel that the Tizanidine is not working.      Onset: November 2017.    Triggers:  Odors, allergies, sinus irritation.  Alleviating factors:, naproxen, Botox.  Meds tried and result:  Inderal, sumatriptan and rizatriptan at times help, for severe migraines, it doesn't help. Indocin did not help, amitriptyline  didn't work topamax didn't work  Caffeine use:  20 ounces of coffee 4 days weekly  OTC medications--frequency:none   Smoking:  None  Sleep apnea:  No  Family Hx:  Brother and possibly mother with migraines  How many per month:   16  Missed days of school/work:  None   Quality:  Frontal throbbing and pulsating, mild headaches back of head  N&V:  None  Photo/phonophobia:   none  Aura:   none       Objective     /66 (BP Location: Left arm, Patient Position: Sitting, BP Cuff Size: Large adult)   Pulse 79   Temp 36.3 °C (97.3 °F) (Temporal)   Resp 14   Ht 1.803 m (5' 11\")   Wt 117 kg (258 lb)   SpO2 97%   BMI 35.98 kg/m²        Assessment & Plan     1. Chronic migraine w/o aura w/o status migrainosus, not intractable    - onabotulinum toxin A (Botox) injection 155 Units     Chronic Migraine:  Botox therapy has reduced patient’s migraines " by more than 7 days and/or 100 hours per month.     I treated this patient in clinic today with BotoxA 155 units according to the dosing/injection paradigm currently mandated by the FDA for the management of chronic migraine. Specifically, I injected 5 units to the procerus, 5 units to the corrugators bilaterally, a total of 20 units to the frontalis musculature, 20 units to the temporalis bilaterally, 15 units to the occipitalis bilaterally, 10 units to the cervical paraspinals bilaterally and 15 units to the trapezius musculature bilaterally. The remainder of the Botox 200 units mixed but not administered was discarded as wastage per FDA guidelines  Consent on file.  Patient identify verified with 2 patient identifiers.     Frequency of headaches is >15 days monthly with at least 8 migraines monthly; Migraines include at least two of the following: worsened with activity or avoidance of activity with migraines (ie they go lie down), moderate to severe pain intensity, pulsing headache, unilateral headache and has  have either nausea or vomiting OR sensitivity to light and sound.     Although this paitent is responding to botox, the paitent is  NOT migraine free, however, and it is my recommendation Botox be continued at this time.    This patient has chronic migraines, defined as having 15 or more headaches days per month, 8 of which are migraines, over a minimum of the last three months. Episodes last more than 4 hours (untreated).  Pt has 2 or more of following (see initial note) -  Headache worsened with activity, pain is moderate to severe intensity, pulsing in nature, unilateral and patient either has nausea/vomiting OR sensitivity to light and sound.   This patient does not also have medication overuse headache.       No adverse effect of Botox noted at conclusion of today's appointment.    Follow up 12/1/2021  for Botox or sooner if needed.

## 2021-09-15 ENCOUNTER — TELEPHONE (OUTPATIENT)
Dept: NEUROLOGY | Facility: MEDICAL CENTER | Age: 31
End: 2021-09-15

## 2021-12-01 ENCOUNTER — OFFICE VISIT (OUTPATIENT)
Dept: NEUROLOGY | Facility: MEDICAL CENTER | Age: 31
End: 2021-12-01
Attending: NURSE PRACTITIONER
Payer: COMMERCIAL

## 2021-12-01 VITALS
RESPIRATION RATE: 14 BRPM | WEIGHT: 260.14 LBS | SYSTOLIC BLOOD PRESSURE: 120 MMHG | TEMPERATURE: 98.2 F | OXYGEN SATURATION: 97 % | BODY MASS INDEX: 36.42 KG/M2 | HEIGHT: 71 IN | HEART RATE: 87 BPM | DIASTOLIC BLOOD PRESSURE: 70 MMHG

## 2021-12-01 DIAGNOSIS — G43.709 CHRONIC MIGRAINE W/O AURA W/O STATUS MIGRAINOSUS, NOT INTRACTABLE: ICD-10-CM

## 2021-12-01 PROCEDURE — 64615 CHEMODENERV MUSC MIGRAINE: CPT | Performed by: NURSE PRACTITIONER

## 2021-12-01 PROCEDURE — 700111 HCHG RX REV CODE 636 W/ 250 OVERRIDE (IP): Performed by: NURSE PRACTITIONER

## 2021-12-01 RX ADMIN — ONABOTULINUMTOXINA 155 UNITS: 200 INJECTION, POWDER, LYOPHILIZED, FOR SOLUTION INTRADERMAL; INTRAMUSCULAR at 08:39

## 2021-12-01 NOTE — PATIENT INSTRUCTIONS
OnabotulinumtoxinA injection (Medical Use)  What is this medicine?  ONABOTULINUMTOXINA (o na POWER you lye num tox in ) is a neuro-muscular blocker. This medicine is used to treat crossed eyes, eyelid spasms, severe neck muscle spasms, ankle and toe muscle spasms, and elbow, wrist, and finger muscle spasms. It is also used to treat excessive underarm sweating, to prevent chronic migraine headaches, and to treat loss of bladder control due to neurologic conditions such as multiple sclerosis or spinal cord injury.  This medicine may be used for other purposes; ask your health care provider or pharmacist if you have questions.  COMMON BRAND NAME(S): Botox  What should I tell my health care provider before I take this medicine?  They need to know if you have any of these conditions:  · breathing problems  · cerebral palsy spasms  · difficulty urinating  · heart problems  · history of surgery where this medicine is going to be used  · infection at the site where this medicine is going to be used  · myasthenia gravis or other neurologic disease  · nerve or muscle disease  · surgery plans  · take medicines that treat or prevent blood clots  · thyroid problems  · an unusual or allergic reaction to botulinum toxin, albumin, other medicines, foods, dyes, or preservatives  · pregnant or trying to get pregnant  · breast-feeding  How should I use this medicine?  This medicine is for injection into a muscle. It is given by a health care professional in a hospital or clinic setting.  Talk to your pediatrician regarding the use of this medicine in children. While this drug may be prescribed for children as young as 2 years old for selected conditions, precautions do apply.  Overdosage: If you think you have taken too much of this medicine contact a poison control center or emergency room at once.  NOTE: This medicine is only for you. Do not share this medicine with others.  What if I miss a dose?  This does not apply.  What may  interact with this medicine?  · aminoglycoside antibiotics like gentamicin, neomycin, tobramycin  · muscle relaxants  · other botulinum toxin injections  This list may not describe all possible interactions. Give your health care provider a list of all the medicines, herbs, non-prescription drugs, or dietary supplements you use. Also tell them if you smoke, drink alcohol, or use illegal drugs. Some items may interact with your medicine.  What should I watch for while using this medicine?  Visit your doctor for regular check ups.  This medicine will cause weakness in the muscle where it is injected. Tell your doctor if you feel unusually weak in other muscles. Get medical help right away if you have problems with breathing, swallowing, or talking.  This medicine might make your eyelids droop or make you see blurry or double. If you have weak muscles or trouble seeing do not drive a car, use machinery, or do other dangerous activities.  This medicine contains albumin from human blood. It may be possible to pass an infection in this medicine, but no cases have been reported. Talk to your doctor about the risks and benefits of this medicine.  If your activities have been limited by your condition, go back to your regular routine slowly after treatment with this medicine.  What side effects may I notice from receiving this medicine?  Side effects that you should report to your doctor or health care professional as soon as possible:  · allergic reactions like skin rash, itching or hives, swelling of the face, lips, or tongue  · breathing problems  · changes in vision  · chest pain or tightness  · eye irritation, pain  · fast, irregular heartbeat  · infection  · numbness  · speech problems  · swallowing problems  · unusual weakness  Side effects that usually do not require medical attention (report to your doctor or health care professional if they continue or are bothersome):  · bruising or pain at site where  injected  · drooping eyelid  · dry eyes or mouth  · headache  · muscles aches, pains  · sensitivity to light  · tearing  This list may not describe all possible side effects. Call your doctor for medical advice about side effects. You may report side effects to FDA at 8-190-TNR-0138.  Where should I keep my medicine?  This drug is given in a hospital or clinic and will not be stored at home.  NOTE: This sheet is a summary. It may not cover all possible information. If you have questions about this medicine, talk to your doctor, pharmacist, or health care provider.  © 2020 [employer] (2019-06-24 14:21:42)

## 2021-12-01 NOTE — PROGRESS NOTES
"Sintia Johnston is a 31 y.o. male who  is here today for Botox for chronic migraine.  He began getting Botox for chronic migraine in 12/2019, it has reduced his migraines from 30/30  to approximately 4/month.  He continues to have minor headaches in the posterior portion of his head which are treated with naproxen and tizanidine.          Rizatriptan doesn't always work for the severe migraines.         His last Botox was 8/31/2021, no adverse effects    In October of 2021 he had a headache for a whole week, he tried some Nurtec samples and they didn't work, stated nothing worked.       He has had an increase in the headaches,    Onset: November 2017.    Triggers:  Odors, allergies, sinus irritation.  Alleviating factors:, naproxen, Botox.  Meds tried and result:  Inderal, sumatriptan and rizatriptan at times help, for severe migraines, it doesn't help. Indocin did not help, amitriptyline  didn't work topamax didn't work  Caffeine use:  20 ounces of coffee 4 days weekly  OTC medications--frequency:none   Smoking:  None  Sleep apnea:  No  Family Hx:  Brother and possibly mother with migraines  How many per month:  4  Missed days of school/work:  None   Quality:  Frontal throbbing and pulsating, mild headaches back of head  N&V:  None  Photo/phonophobia:   none  Aura:   none      Objective      Encounter Vitals  Standard Vitals  Vitals  Blood Pressure: 120/70  Temperature: 36.8 °C (98.2 °F)  Temp src: Temporal  Pulse: 87  Respiration: 14  Pulse Oximetry: 97 %  Height: 180.3 cm (5' 11\")  Weight: 118 kg (260 lb 2.3 oz)  Encounter Vitals  Temperature: 36.8 °C (98.2 °F)  Temp src: Temporal  Blood Pressure: 120/70  Pulse: 87  Respiration: 14  Pulse Oximetry: 97 %  Weight: 118 kg (260 lb 2.3 oz)  Height: 180.3 cm (5' 11\")  BMI (Calculated): 36.28        Assessment & Plan     1. Chronic migraine w/o aura w/o status migrainosus, not intractable    - onabotulinum toxin A (Botox) injection 155 " Units    Chronic Migraine:  Botox therapy has reduced patient’s migraines by more than 7 days and/or 100 hours per month.     I treated this patient in clinic today with BotoxA 155 units according to the dosing/injection paradigm currently mandated by the FDA for the management of chronic migraine. Specifically, I injected 5 units to the procerus, 5 units to the corrugators bilaterally, a total of 20 units to the frontalis musculature, 20 units to the temporalis bilaterally, 15 units to the occipitalis bilaterally, 10 units to the cervical paraspinals bilaterally and 15 units to the trapezius musculature bilaterally. The remainder of the Botox 200 units mixed but not administered was discarded as wastage per FDA guidelines  Consent on file.  Patient identify verified with 2 patient identifiers.     Frequency of headaches is >15 days monthly with at least 8 migraines monthly; Migraines include at least two of the following: worsened with activity or avoidance of activity with migraines (ie they go lie down), moderate to severe pain intensity, pulsing headache, unilateral headache and has  have either nausea or vomiting OR sensitivity to light and sound.     Although this patient is responding to botox, the patient is  NOT migraine free, however, and it is my recommendation Botox be continued at this time.    This patient has chronic migraines, defined as having 15 or more headaches days per month, 8 of which are migraines, over a minimum of the last three months. Episodes last more than 4 hours (untreated).  Pt has 2 or more of following (see initial note) -  Headache worsened with activity, pain is moderate to severe intensity, pulsing in nature, unilateral and patient either has nausea/vomiting OR sensitivity to light and sound.   This patient does not  also have medication overuse headache.      No adverse effect of Botox noted at conclusion of today's appointment.    Follow up 3/1/2022  for Botox or sooner if  needed.     Continue Rizatriptan for rescue    Call or send message for migraines that are not relieved, we can do Toradol injection or steroid dose pack.

## 2022-01-06 ENCOUNTER — OFFICE VISIT (OUTPATIENT)
Dept: URGENT CARE | Facility: CLINIC | Age: 32
End: 2022-01-06
Payer: COMMERCIAL

## 2022-01-06 VITALS
HEIGHT: 71 IN | OXYGEN SATURATION: 99 % | HEART RATE: 68 BPM | SYSTOLIC BLOOD PRESSURE: 128 MMHG | DIASTOLIC BLOOD PRESSURE: 80 MMHG | RESPIRATION RATE: 16 BRPM | BODY MASS INDEX: 35 KG/M2 | WEIGHT: 250 LBS | TEMPERATURE: 97.2 F

## 2022-01-06 DIAGNOSIS — K64.4 EXTERNAL HEMORRHOID: ICD-10-CM

## 2022-01-06 PROCEDURE — 99213 OFFICE O/P EST LOW 20 MIN: CPT | Performed by: PHYSICIAN ASSISTANT

## 2022-01-06 RX ORDER — HYDROCORTISONE 25 MG/G
CREAM TOPICAL
Qty: 28 G | Refills: 1 | Status: SHIPPED | OUTPATIENT
Start: 2022-01-06 | End: 2022-08-16

## 2022-01-06 ASSESSMENT — ENCOUNTER SYMPTOMS
NAUSEA: 0
CHILLS: 0
CONSTIPATION: 0
DIARRHEA: 0
BLOOD IN STOOL: 0
FEVER: 0
VOMITING: 0
ROS GI COMMENTS: HEMORRHOID
ABDOMINAL PAIN: 0

## 2022-01-06 NOTE — PATIENT INSTRUCTIONS
Hemorrhoids  Hemorrhoids are swollen veins in and around the rectum or anus. There are two types of hemorrhoids:  · Internal hemorrhoids. These occur in the veins that are just inside the rectum. They may poke through to the outside and become irritated and painful.  · External hemorrhoids. These occur in the veins that are outside the anus and can be felt as a painful swelling or hard lump near the anus.  Most hemorrhoids do not cause serious problems, and they can be managed with home treatments such as diet and lifestyle changes. If home treatments do not help the symptoms, procedures can be done to shrink or remove the hemorrhoids.  What are the causes?  This condition is caused by increased pressure in the anal area. This pressure may result from various things, including:  · Constipation.  · Straining to have a bowel movement.  · Diarrhea.  · Pregnancy.  · Obesity.  · Sitting for long periods of time.  · Heavy lifting or other activity that causes you to strain.  · Anal sex.  · Riding a bike for a long period of time.  What are the signs or symptoms?  Symptoms of this condition include:  · Pain.  · Anal itching or irritation.  · Rectal bleeding.  · Leakage of stool (feces).  · Anal swelling.  · One or more lumps around the anus.  How is this diagnosed?  This condition can often be diagnosed through a visual exam. Other exams or tests may also be done, such as:  · An exam that involves feeling the rectal area with a gloved hand (digital rectal exam).  · An exam of the anal canal that is done using a small tube (anoscope).  · A blood test, if you have lost a significant amount of blood.  · A test to look inside the colon using a flexible tube with a camera on the end (sigmoidoscopy or colonoscopy).  How is this treated?  This condition can usually be treated at home. However, various procedures may be done if dietary changes, lifestyle changes, and other home treatments do not help your symptoms. These  procedures can help make the hemorrhoids smaller or remove them completely. Some of these procedures involve surgery, and others do not. Common procedures include:  · Rubber band ligation. Rubber bands are placed at the base of the hemorrhoids to cut off their blood supply.  · Sclerotherapy. Medicine is injected into the hemorrhoids to shrink them.  · Infrared coagulation. A type of light energy is used to get rid of the hemorrhoids.  · Hemorrhoidectomy surgery. The hemorrhoids are surgically removed, and the veins that supply them are tied off.  · Stapled hemorrhoidopexy surgery. The surgeon staples the base of the hemorrhoid to the rectal wall.  Follow these instructions at home:  Eating and drinking    · Eat foods that have a lot of fiber in them, such as whole grains, beans, nuts, fruits, and vegetables.  · Ask your health care provider about taking products that have added fiber (fiber supplements).  · Reduce the amount of fat in your diet. You can do this by eating low-fat dairy products, eating less red meat, and avoiding processed foods.  · Drink enough fluid to keep your urine pale yellow.  Managing pain and swelling    · Take warm sitz baths for 20 minutes, 3-4 times a day to ease pain and discomfort. You may do this in a bathtub or using a portable sitz bath that fits over the toilet.  · If directed, apply ice to the affected area. Using ice packs between sitz baths may be helpful.  ? Put ice in a plastic bag.  ? Place a towel between your skin and the bag.  ? Leave the ice on for 20 minutes, 2-3 times a day.  General instructions  · Take over-the-counter and prescription medicines only as told by your health care provider.  · Use medicated creams or suppositories as told.  · Get regular exercise. Ask your health care provider how much and what kind of exercise is best for you. In general, you should do moderate exercise for at least 30 minutes on most days of the week (150 minutes each week). This can  include activities such as walking, biking, or yoga.  · Go to the bathroom when you have the urge to have a bowel movement. Do not wait.  · Avoid straining to have bowel movements.  · Keep the anal area dry and clean. Use wet toilet paper or moist towelettes after a bowel movement.  · Do not sit on the toilet for long periods of time. This increases blood pooling and pain.  · Keep all follow-up visits as told by your health care provider. This is important.  Contact a health care provider if you have:  · Increasing pain and swelling that are not controlled by treatment or medicine.  · Difficulty having a bowel movement, or you are unable to have a bowel movement.  · Pain or inflammation outside the area of the hemorrhoids.  Get help right away if you have:  · Uncontrolled bleeding from your rectum.  Summary  · Hemorrhoids are swollen veins in and around the rectum or anus.  · Most hemorrhoids can be managed with home treatments such as diet and lifestyle changes.  · Taking warm sitz baths can help ease pain and discomfort.  · In severe cases, procedures or surgery can be done to shrink or remove the hemorrhoids.  This information is not intended to replace advice given to you by your health care provider. Make sure you discuss any questions you have with your health care provider.  Document Released: 12/15/2001 Document Revised: 12/26/2019 Document Reviewed: 05/09/2019  Elsevier Patient Education © 2020 [employer]

## 2022-01-06 NOTE — PROGRESS NOTES
Subjective:   Librado Johnston is a 31 y.o. male who presents for Hemorrhoids (/lump near rectum, itching/irritation, initially started in September ) and Other (PT REQUESTING PRINTED RX)      HPI  31 y.o. male presents to urgent care with new problem to provider of perianal lump noted intermittently over the last few months. Reports BRB on toilet paper with wiping and symptoms seem to be worse after straining with bowel movement and sitting for long periods of time.  Patient states he drinks plenty of water daily.  He denies chronic constipation.  Reports normal bowel movement 1-2 times daily.  No abdominal pain, nausea, vomiting, or diarrhea.  No blood in stools or melena.  He has been using OTC hemorrhoid cream with some relief. Denies other associated aggravating or alleviating factors.     Review of Systems   Constitutional: Negative for chills and fever.   Gastrointestinal: Negative for abdominal pain, blood in stool, constipation, diarrhea, melena, nausea and vomiting.        Hemorrhoid       Patient Active Problem List   Diagnosis   • Anxiety   • NASIR (generalized anxiety disorder)   • Tension headache   • Primary insomnia   • Intractable chronic migraine without aura and without status migrainosus     Past Surgical History:   Procedure Laterality Date   • TURBINATE REDUCTION Bilateral 9/4/2018    Procedure: TURBINATE REDUCTION - SUBMUCOSAL APPROACH;  Surgeon: Alexis Syed M.D.;  Location: SURGERY SAME DAY [facility];  Service: Ent   • SEPTOPLASTY Bilateral 9/4/2018    Procedure: SEPTOPLASTY;  Surgeon: Alexis Syed M.D.;  Location: SURGERY SAME DAY [facility];  Service: Ent     Social History     Tobacco Use   • Smoking status: Never Smoker   • Smokeless tobacco: Never Used   Vaping Use   • Vaping Use: Never used   Substance Use Topics   • Alcohol use: Yes     Comment: social    • Drug use: No      Family History   Problem Relation Age of Onset   • Depression Mother    • Bipolar disorder Neg Hx   "  • Schizophrenia Neg Hx    • Suicide Attempts Neg Hx       (Allergies, Medications, & Tobacco/Substance Use were reconciled by the Medical Assistant and reviewed by myself. The family history is prepopulated)     Objective:     /80   Pulse 68   Temp 36.2 °C (97.2 °F) (Temporal)   Resp 16   Ht 1.803 m (5' 11\")   Wt 113 kg (250 lb)   SpO2 99%   BMI 34.87 kg/m²     Physical Exam  Vitals reviewed. Exam conducted with a chaperone present.   Constitutional:       General: He is not in acute distress.     Appearance: Normal appearance.   HENT:      Head: Normocephalic and atraumatic.      Mouth/Throat:      Mouth: Mucous membranes are moist.      Pharynx: Oropharynx is clear.   Cardiovascular:      Rate and Rhythm: Normal rate and regular rhythm.      Heart sounds: Normal heart sounds.   Pulmonary:      Effort: Pulmonary effort is normal. No respiratory distress.      Breath sounds: Normal breath sounds.   Abdominal:      General: There is no distension.      Palpations: Abdomen is soft.      Tenderness: There is no abdominal tenderness. There is no guarding.   Genitourinary:     Rectum: External hemorrhoid present. No tenderness or anal fissure. Normal anal tone.      Comments: Small minimally inflamed and nonthrombosed external hemorrhoid at the 9 o'clock position.  No rectal bleeding  Neurological:      Mental Status: He is alert and oriented to person, place, and time.   Psychiatric:         Mood and Affect: Mood normal.         Behavior: Behavior normal.         Thought Content: Thought content normal.         Judgment: Judgment normal.         Assessment/Plan:     1. External hemorrhoid  hydrocortisone rectal (PERIANAL) 2.5% Cream     Information regarding diagnoses provided in AVS handout at discharge from urgent care.  Recommend sitz bath's, increase dietary fiber, drink plenty of fluids, and avoid straining with bowel movements to prevent further inflammation of external hemorrhoid.    Differential " diagnosis, natural history, supportive care, and indications for immediate follow-up discussed.    Advised the patient to follow-up with the primary care physician for recheck, reevaluation, and consideration of further management.  Patient verbalized understanding of treatment plan and has no further questions regarding care.     I personally reviewed prior external notes and test results pertinent to today's visit.     Please note that this dictation was created using voice recognition software. I have made a reasonable attempt to correct obvious errors, but I expect that there are errors of grammar and possibly content that I did not discover before finalizing the note.    This note was electronically signed by Mellissa Rodriguez PA-C

## 2022-01-07 DIAGNOSIS — G43.709 CHRONIC MIGRAINE W/O AURA W/O STATUS MIGRAINOSUS, NOT INTRACTABLE: ICD-10-CM

## 2022-03-01 ENCOUNTER — OFFICE VISIT (OUTPATIENT)
Dept: NEUROLOGY | Facility: MEDICAL CENTER | Age: 32
End: 2022-03-01
Attending: NURSE PRACTITIONER
Payer: COMMERCIAL

## 2022-03-01 VITALS
WEIGHT: 264.55 LBS | BODY MASS INDEX: 37.04 KG/M2 | OXYGEN SATURATION: 97 % | TEMPERATURE: 97.7 F | HEART RATE: 83 BPM | HEIGHT: 71 IN | DIASTOLIC BLOOD PRESSURE: 70 MMHG | RESPIRATION RATE: 14 BRPM | SYSTOLIC BLOOD PRESSURE: 124 MMHG

## 2022-03-01 DIAGNOSIS — G43.709 CHRONIC MIGRAINE W/O AURA W/O STATUS MIGRAINOSUS, NOT INTRACTABLE: ICD-10-CM

## 2022-03-01 PROCEDURE — 64615 CHEMODENERV MUSC MIGRAINE: CPT | Performed by: NURSE PRACTITIONER

## 2022-03-01 PROCEDURE — 700111 HCHG RX REV CODE 636 W/ 250 OVERRIDE (IP): Performed by: NURSE PRACTITIONER

## 2022-03-01 RX ORDER — ALPRAZOLAM 0.5 MG/1
TABLET ORAL
COMMUNITY
Start: 2022-01-20

## 2022-03-01 RX ORDER — CYCLOBENZAPRINE HCL 10 MG
10 TABLET ORAL 2 TIMES DAILY PRN
Qty: 20 TABLET | Refills: 11
Start: 2022-03-01 | End: 2022-08-16 | Stop reason: SDUPTHER

## 2022-03-01 RX ORDER — DICLOFENAC SODIUM 75 MG/1
75 TABLET, DELAYED RELEASE ORAL 2 TIMES DAILY PRN
Qty: 15 TABLET | Refills: 11 | Status: SHIPPED | OUTPATIENT
Start: 2022-03-01 | End: 2022-08-16

## 2022-03-01 RX ADMIN — ONABOTULINUMTOXINA 155 UNITS: 200 INJECTION, POWDER, LYOPHILIZED, FOR SOLUTION INTRADERMAL; INTRAMUSCULAR at 08:45

## 2022-03-01 ASSESSMENT — PATIENT HEALTH QUESTIONNAIRE - PHQ9: CLINICAL INTERPRETATION OF PHQ2 SCORE: 0

## 2022-03-01 NOTE — PATIENT INSTRUCTIONS
OnabotulinumtoxinA injection (Medical Use)  What is this medicine?  ONABOTULINUMTOXINA (o na POWER you lye num tox in ) is a neuro-muscular blocker. This medicine is used to treat crossed eyes, eyelid spasms, severe neck muscle spasms, ankle and toe muscle spasms, and elbow, wrist, and finger muscle spasms. It is also used to treat excessive underarm sweating, to prevent chronic migraine headaches, and to treat loss of bladder control due to neurologic conditions such as multiple sclerosis or spinal cord injury.  This medicine may be used for other purposes; ask your health care provider or pharmacist if you have questions.  COMMON BRAND NAME(S): Botox  What should I tell my health care provider before I take this medicine?  They need to know if you have any of these conditions:  · breathing problems  · cerebral palsy spasms  · difficulty urinating  · heart problems  · history of surgery where this medicine is going to be used  · infection at the site where this medicine is going to be used  · myasthenia gravis or other neurologic disease  · nerve or muscle disease  · surgery plans  · take medicines that treat or prevent blood clots  · thyroid problems  · an unusual or allergic reaction to botulinum toxin, albumin, other medicines, foods, dyes, or preservatives  · pregnant or trying to get pregnant  · breast-feeding  How should I use this medicine?  This medicine is for injection into a muscle. It is given by a health care professional in a hospital or clinic setting.  Talk to your pediatrician regarding the use of this medicine in children. While this drug may be prescribed for children as young as 2 years old for selected conditions, precautions do apply.  Overdosage: If you think you have taken too much of this medicine contact a poison control center or emergency room at once.  NOTE: This medicine is only for you. Do not share this medicine with others.  What if I miss a dose?  This does not apply.  What may  interact with this medicine?  · aminoglycoside antibiotics like gentamicin, neomycin, tobramycin  · muscle relaxants  · other botulinum toxin injections  This list may not describe all possible interactions. Give your health care provider a list of all the medicines, herbs, non-prescription drugs, or dietary supplements you use. Also tell them if you smoke, drink alcohol, or use illegal drugs. Some items may interact with your medicine.  What should I watch for while using this medicine?  Visit your doctor for regular check ups.  This medicine will cause weakness in the muscle where it is injected. Tell your doctor if you feel unusually weak in other muscles. Get medical help right away if you have problems with breathing, swallowing, or talking.  This medicine might make your eyelids droop or make you see blurry or double. If you have weak muscles or trouble seeing do not drive a car, use machinery, or do other dangerous activities.  This medicine contains albumin from human blood. It may be possible to pass an infection in this medicine, but no cases have been reported. Talk to your doctor about the risks and benefits of this medicine.  If your activities have been limited by your condition, go back to your regular routine slowly after treatment with this medicine.  What side effects may I notice from receiving this medicine?  Side effects that you should report to your doctor or health care professional as soon as possible:  · allergic reactions like skin rash, itching or hives, swelling of the face, lips, or tongue  · breathing problems  · changes in vision  · chest pain or tightness  · eye irritation, pain  · fast, irregular heartbeat  · infection  · numbness  · speech problems  · swallowing problems  · unusual weakness  Side effects that usually do not require medical attention (report to your doctor or health care professional if they continue or are bothersome):  · bruising or pain at site where  injected  · drooping eyelid  · dry eyes or mouth  · headache  · muscles aches, pains  · sensitivity to light  · tearing  This list may not describe all possible side effects. Call your doctor for medical advice about side effects. You may report side effects to FDA at 1-773-MZX-4026.  Where should I keep my medicine?  This drug is given in a hospital or clinic and will not be stored at home.  NOTE: This sheet is a summary. It may not cover all possible information. If you have questions about this medicine, talk to your doctor, pharmacist, or health care provider.  © 2020 [employer] (2019-06-24 14:21:42)

## 2022-03-01 NOTE — PROGRESS NOTES
Subjective     HPI      Librado Johnston is a 31 y.o. male who  is here today for Botox for chronic migraine.  He began getting Botox for chronic migraine in 12/2019, it has reduced his migraines from 30/30  to approximately 4/month.  He continues to have minor headaches in the posterior portion of his head which are treated with naproxen and cyclobenzaprine.  He doesn't really think the naproxen is working any more.           Rizatriptan no longer works, he feels that NSAIDS work better, although he doesn't feel that Naproxen works that well any more.            His last Botox was 12/1/2021 , no adverse effects         He has had an increase in the headaches,    Onset: November 2017.    Triggers:  Odors, allergies, sinus irritation.  Alleviating factors:, naproxen, Botox.  Meds tried and result:  Inderal, sumatriptan and rizatriptan at times help, for severe migraines, it doesn't help. Indocin did not help, amitriptyline  didn't work topamax didn't work. Nurtec didn't work, naproxen and rizatriptan are not really helping any more.   Caffeine use:  20 ounces of coffee 4 days weekly  OTC medications--frequency:none   Smoking:  None  Sleep apnea:  No  Family Hx:  Brother and possibly mother with migraines  How many per month:  4  Missed days of school/work:  None   Quality:  Frontal throbbing and pulsating, mild headaches back of head  N&V:  None  Photo/phonophobia:   none  Aura:   none        Current Outpatient Medications on File Prior to Visit   Medication Sig Dispense Refill   • ALPRAZolam (XANAX) 0.5 MG Tab TAKE 0.5 - 1 TABLET BY MOUTH ONCE A DAY AS NEEDED FOR ANXIETY FOR 30 DAYS     • hydrocortisone rectal (PERIANAL) 2.5% Cream Apply twice daily to affected area 28 g 1   • propranolol (INDERAL) 10 MG Tab Take 10 mg by mouth 1 time daily as needed.  1   • amphetamine-dextroamphetamine (ADDERALL) 10 MG Tab Take 5 mg by mouth 2 times a day.     • Multiple Vitamins-Minerals (DAILY MULTIVITAMIN PO) Take 1 Tab by  "mouth every day.       No current facility-administered medications on file prior to visit.       Objective     /70 (BP Location: Right arm, Patient Position: Sitting, BP Cuff Size: Large adult)   Pulse 83   Temp 36.5 °C (97.7 °F) (Temporal)   Resp 14   Ht 1.803 m (5' 11\")   Wt 120 kg (264 lb 8.8 oz)   SpO2 97%   BMI 36.90 kg/m²        Assessment & Plan      1. Chronic migraine w/o aura w/o status migrainosus, not intractable    - onabotulinum toxin A (Botox) injection 155 Units     Chronic Migraine:  Botox therapy has reduced patient’s migraines by more than 7 days and/or 100 hours per month.     I treated this patient in clinic today with BotoxA 155 units according to the dosing/injection paradigm currently mandated by the FDA for the management of chronic migraine. Specifically, I injected 5 units to the procerus, 5 units to the corrugators bilaterally, a total of 20 units to the frontalis musculature, 20 units to the temporalis bilaterally, 15 units to the occipitalis bilaterally, 10 units to the cervical paraspinals bilaterally and 15 units to the trapezius musculature bilaterally. The remainder of the Botox 200 units mixed but not administered was discarded as wastage per FDA guidelines  Consent on file.  Patient identify verified with 2 patient identifiers.     Frequency of headaches is >15 days monthly with at least 8 migraines monthly; Migraines include at least two of the following: worsened with activity or avoidance of activity with migraines (ie they go lie down), moderate to severe pain intensity, pulsing headache, unilateral headache and has  have either nausea or vomiting OR sensitivity to light and sound.     Although this patient is responding to botox, the patient is  NOT migraine free, however, and it is my recommendation Botox be continued at this time.    This patient has chronic migraines, defined as having 15 or more headaches days per month, 8 of which are migraines, over a " minimum of the last three months. Episodes last more than 4 hours (untreated).  Pt has 2 or more of following (see initial note) -  Headache worsened with activity, pain is moderate to severe intensity, pulsing in nature, unilateral and patient either has nausea/vomiting OR sensitivity to light and sound.   This patient does not also have medication overuse headache.     No adverse effect of Botox noted at conclusion of today's appointment.    Follow up in 12 weeks for Botox or sooner if needed.    May take diclofenac 75 up to twice per day, no more than 15 per month.      May continue Flexeril

## 2022-05-24 ENCOUNTER — OFFICE VISIT (OUTPATIENT)
Dept: NEUROLOGY | Facility: MEDICAL CENTER | Age: 32
End: 2022-05-24
Attending: NURSE PRACTITIONER
Payer: COMMERCIAL

## 2022-05-24 VITALS
SYSTOLIC BLOOD PRESSURE: 138 MMHG | OXYGEN SATURATION: 98 % | DIASTOLIC BLOOD PRESSURE: 86 MMHG | BODY MASS INDEX: 35.39 KG/M2 | HEIGHT: 71 IN | WEIGHT: 252.8 LBS | HEART RATE: 83 BPM

## 2022-05-24 DIAGNOSIS — G43.709 CHRONIC MIGRAINE W/O AURA W/O STATUS MIGRAINOSUS, NOT INTRACTABLE: ICD-10-CM

## 2022-05-24 PROCEDURE — 700111 HCHG RX REV CODE 636 W/ 250 OVERRIDE (IP): Performed by: NURSE PRACTITIONER

## 2022-05-24 PROCEDURE — 64615 CHEMODENERV MUSC MIGRAINE: CPT

## 2022-05-24 PROCEDURE — 64615 CHEMODENERV MUSC MIGRAINE: CPT | Performed by: NURSE PRACTITIONER

## 2022-05-24 RX ORDER — NAPROXEN SODIUM 550 MG/1
550 TABLET ORAL 2 TIMES DAILY PRN
Qty: 20 TABLET | Refills: 11 | Status: SHIPPED | OUTPATIENT
Start: 2022-05-24 | End: 2022-08-16 | Stop reason: SDUPTHER

## 2022-05-24 RX ORDER — KETOROLAC TROMETHAMINE 30 MG/ML
60 INJECTION, SOLUTION INTRAMUSCULAR; INTRAVENOUS
Qty: 4 ML | Refills: 11 | Status: SHIPPED | OUTPATIENT
Start: 2022-05-24 | End: 2022-11-17 | Stop reason: SDUPTHER

## 2022-05-24 RX ORDER — PREDNISOLONE ACETATE 10 MG/ML
SUSPENSION/ DROPS OPHTHALMIC
COMMUNITY
Start: 2022-03-28 | End: 2022-08-16

## 2022-05-24 RX ADMIN — ONABOTULINUMTOXINA 155 UNITS: 200 INJECTION, POWDER, LYOPHILIZED, FOR SOLUTION INTRADERMAL; INTRAMUSCULAR at 09:53

## 2022-05-24 ASSESSMENT — PATIENT HEALTH QUESTIONNAIRE - PHQ9: CLINICAL INTERPRETATION OF PHQ2 SCORE: 0

## 2022-05-24 NOTE — PROGRESS NOTES
Subjective     Librado Johnston is a 31 y.o. male who presents for Botox for chronic migraine.    I last saw him on 3/1/2022, no adverse effects.        He began getting Botox for chronic migraine in 12/2019, it has reduced his migraines from 30/30  to approximately 4/month.  He continues to have minor headaches in the posterior portion of his head which are treated with naproxen and cyclobenzaprine.  He doesn't really think the naproxen is working any more.          The diclofenac tablet didn't work very well, naproxen worked better.            He has had an increase in the headaches,    Onset: November 2017.    Triggers:  Odors, allergies, sinus irritation.  Alleviating factors:, naproxen, Botox., shower  Meds tried and result:  Inderal, sumatriptan and rizatriptan at times help, for severe migraines, it doesn't help. Indocin did not help, amitriptyline  didn't work topamax didn't work. Nurtec didn't work, naproxen and rizatriptan are not really helping any more.   Caffeine use:  20 ounces of coffee 4 days weekly  OTC medications--frequency:none   Smoking:  None  Sleep apnea:  No  Family Hx:  Brother and possibly mother with migraines  How many per month:  4  Missed days of school/work:  None   Quality:  Frontal throbbing and pulsating, mild headaches back of head  N&V:  None  Photo/phonophobia:   none  Aura:   none        Current Outpatient Medications on File Prior to Visit   Medication Sig Dispense Refill   • prednisoLONE acetate (PRED FORTE) 1 % Suspension INSTILL 1 DROP INTO BOTH EYES 4 TIMES DAILY FOR 5 DAYS     • ALPRAZolam (XANAX) 0.5 MG Tab TAKE 0.5 - 1 TABLET BY MOUTH ONCE A DAY AS NEEDED FOR ANXIETY FOR 30 DAYS     • cyclobenzaprine (FLEXERIL) 10 mg Tab Take 1 Tablet by mouth 2 times a day as needed. 20 Tablet 11   • diclofenac DR (VOLTAREN) 75 MG Tablet Delayed Response Take 1 Tablet by mouth 2 times a day as needed (headache, no more than 15 per month). 15 Tablet 11   • hydrocortisone rectal  "(PERIANAL) 2.5% Cream Apply twice daily to affected area 28 g 1   • propranolol (INDERAL) 10 MG Tab Take 10 mg by mouth 1 time daily as needed.  1   • amphetamine-dextroamphetamine (ADDERALL) 10 MG Tab Take 5 mg by mouth 2 times a day.     • Multiple Vitamins-Minerals (DAILY MULTIVITAMIN PO) Take 1 Tab by mouth every day.       No current facility-administered medications on file prior to visit.         Objective     /86 (BP Location: Right arm, Patient Position: Sitting, BP Cuff Size: Adult long)   Pulse 83   Ht 1.803 m (5' 11\")   Wt 115 kg (252 lb 12.8 oz)   SpO2 98%   BMI 35.26 kg/m²           Assessment & Plan     1. Chronic migraine w/o aura w/o status migrainosus, not intractable    - naproxen sodium (ANAPROX) 550 MG tablet; Take 1 Tablet by mouth 2 times a day as needed (MIGRAINE).  Dispense: 20 Tablet; Refill: 11  - ketorolac (TORADOL) 60 MG/2ML Solution; Inject 2 mL into the shoulder, thigh, or buttocks 1 time a day as needed (MIGRAINE, NO MORE THAN 2XMONTH, 1 X WEEKLY).  Dispense: 4 mL; Refill: 11     Chronic Migraine:  Botox therapy has reduced patient’s migraines by more than 7 days and/or 100 hours per month.     I treated this patient in clinic today with BotoxA 155 units according to the dosing/injection paradigm currently mandated by the FDA for the management of chronic migraine. Specifically, I injected 5 units to the procerus, 5 units to the corrugators bilaterally, a total of 20 units to the frontalis musculature, 20 units to the temporalis bilaterally, 15 units to the occipitalis bilaterally, 10 units to the cervical paraspinals bilaterally and 15 units to the trapezius musculature bilaterally. The remainder of the Botox 200 units mixed but not administered was discarded as wastage per FDA guidelines  Consent on file.  Patient identify verified with 2 patient identifiers.     Frequency of headaches is >15 days monthly with at least 8 migraines monthly; Migraines include at least two " of the following: worsened with activity or avoidance of activity with migraines (ie they go lie down), moderate to severe pain intensity, pulsing headache, unilateral headache and has  have either nausea or vomiting OR sensitivity to light and sound.     Although this patient is responding to botox, the patient is  NOT migraine free, however, and it is my recommendation Botox be continued at this time.    This patient has chronic migraines, defined as having 15 or more headaches days per month, 8 of which are migraines, over a minimum of the last three months. Episodes last more than 4 hours (untreated).  Pt has 2 or more of following (see initial note) -  Headache worsened with activity, pain is moderate to severe intensity, pulsing in nature, unilateral and patient either has nausea/vomiting OR sensitivity to light and sound  This patient does not also have medication overuse headache.      No adverse effect of Botox noted at conclusion of today's appointment.    Follow up 8/16/2022 for Botox or sooner if needed.

## 2022-06-16 DIAGNOSIS — G43.709 CHRONIC MIGRAINE W/O AURA W/O STATUS MIGRAINOSUS, NOT INTRACTABLE: ICD-10-CM

## 2022-08-16 ENCOUNTER — OFFICE VISIT (OUTPATIENT)
Dept: NEUROLOGY | Facility: MEDICAL CENTER | Age: 32
End: 2022-08-16
Attending: NURSE PRACTITIONER
Payer: COMMERCIAL

## 2022-08-16 VITALS
OXYGEN SATURATION: 98 % | TEMPERATURE: 97.4 F | BODY MASS INDEX: 36.17 KG/M2 | RESPIRATION RATE: 16 BRPM | DIASTOLIC BLOOD PRESSURE: 74 MMHG | HEART RATE: 95 BPM | SYSTOLIC BLOOD PRESSURE: 134 MMHG | HEIGHT: 70 IN | WEIGHT: 252.65 LBS

## 2022-08-16 DIAGNOSIS — G43.709 CHRONIC MIGRAINE W/O AURA W/O STATUS MIGRAINOSUS, NOT INTRACTABLE: ICD-10-CM

## 2022-08-16 PROCEDURE — 700111 HCHG RX REV CODE 636 W/ 250 OVERRIDE (IP): Performed by: NURSE PRACTITIONER

## 2022-08-16 PROCEDURE — 64615 CHEMODENERV MUSC MIGRAINE: CPT | Performed by: NURSE PRACTITIONER

## 2022-08-16 RX ORDER — CYCLOBENZAPRINE HCL 10 MG
10 TABLET ORAL 2 TIMES DAILY PRN
Qty: 20 TABLET | Refills: 11 | Status: SHIPPED | OUTPATIENT
Start: 2022-08-16 | End: 2023-08-16

## 2022-08-16 RX ORDER — NAPROXEN SODIUM 550 MG/1
550 TABLET ORAL 2 TIMES DAILY PRN
Qty: 20 TABLET | Refills: 11 | Status: SHIPPED | OUTPATIENT
Start: 2022-08-16 | End: 2022-11-17 | Stop reason: SDUPTHER

## 2022-08-16 RX ORDER — CLONIDINE HYDROCHLORIDE 0.1 MG/1
TABLET ORAL
COMMUNITY
Start: 2022-06-24

## 2022-08-16 RX ADMIN — ONABOTULINUMTOXINA 155 UNITS: 200 INJECTION, POWDER, LYOPHILIZED, FOR SOLUTION INTRADERMAL; INTRAMUSCULAR at 07:56

## 2022-08-16 NOTE — PROGRESS NOTES
Subjective     Librado Johnston is a 32 y.o. male who presents for Botox for chronic migraine.      He began getting Botox for chronic migraine in 12/2019, it has reduced his migraines from 30/30  to approximately 4-8 per month.  He uses naproxen and cyclobenzaprine.         The diclofenac tablet didn't work very well, naproxen worked better.            Onset: November 2017.    Triggers:  Odors, allergies, sinus irritation.  Alleviating factors:, naproxen, Botox., shower  Meds tried and result:  Inderal, sumatriptan and rizatriptan at times help, for severe migraines, it doesn't help. Indocin did not help, amitriptyline  didn't work topamax didn't work. Nurtec didn't work, naproxen and rizatriptan are not really helping any more.   Caffeine use:  20 ounces of coffee 4 days weekly  OTC medications--frequency:none   Smoking:  None  Sleep apnea:  No  Family Hx:  Brother and possibly mother with migraines  How many per month: 4-8  Missed days of school/work:  None   Quality:  Frontal throbbing and pulsating, mild headaches back of head  N&V:  None  Photo/phonophobia:   none  Aura:   none       Current Outpatient Medications on File Prior to Visit   Medication Sig Dispense Refill    cloNIDine (CATAPRES) 0.1 MG Tab TAKE 1 TABLET BY MOUTH EVERY DAY AS NEEDED FOR ANXIETY      naproxen sodium (ANAPROX) 550 MG tablet Take 1 Tablet by mouth 2 times a day as needed (MIGRAINE). 20 Tablet 11    ketorolac (TORADOL) 60 MG/2ML Solution Inject 2 mL into the shoulder, thigh, or buttocks 1 time a day as needed (MIGRAINE, NO MORE THAN 2XMONTH, 1 X WEEKLY). 4 mL 11    ALPRAZolam (XANAX) 0.5 MG Tab TAKE 0.5 - 1 TABLET BY MOUTH ONCE A DAY AS NEEDED FOR ANXIETY FOR 30 DAYS      cyclobenzaprine (FLEXERIL) 10 mg Tab Take 1 Tablet by mouth 2 times a day as needed. 20 Tablet 11    propranolol (INDERAL) 10 MG Tab Take 10 mg by mouth 1 time daily as needed.  1    amphetamine-dextroamphetamine (ADDERALL) 10 MG Tab Take 5 mg by mouth 2 times a  "day.      Multiple Vitamins-Minerals (DAILY MULTIVITAMIN PO) Take 1 Tab by mouth every day.       No current facility-administered medications on file prior to visit.         Objective     /74 (BP Location: Left arm, Patient Position: Sitting, BP Cuff Size: Adult)   Pulse 95   Temp 36.3 °C (97.4 °F) (Temporal)   Resp 16   Ht 1.778 m (5' 10\")   Wt 115 kg (252 lb 10.4 oz)   SpO2 98%   BMI 36.25 kg/m²        Assessment & Plan       1. Chronic migraine w/o aura w/o status migrainosus, not intractable    - onabotulinum toxin A (Botox) injection 155 Units           Botox therapy has reduced patient’s migraines by more than 7 days and/or 100 hours per month.     I treated this patient in clinic today with BotoxA 155 units according to the dosing/injection paradigm currently mandated by the FDA for the management of chronic migraine. Specifically, I injected 5 units to the procerus, 5 units to the corrugators bilaterally, a total of 20 units to the frontalis musculature, 20 units to the temporalis bilaterally, 15 units to the occipitalis bilaterally, 10 units to the cervical paraspinals bilaterally and 15 units to the trapezius musculature bilaterally. The remainder of the Botox 200 units mixed but not administered was discarded as wastage per FDA guidelines  Consent on file.  Patient identify verified with 2 patient identifiers.     Frequency of headaches is >15 days monthly with at least 8 migraines monthly; Migraines include at least two of the following: worsened with activity or avoidance of activity with migraines (ie they go lie down), moderate to severe pain intensity, pulsing headache, unilateral headache and has  have either nausea or vomiting OR sensitivity to light and sound.     Although this patient is responding to botox, the patient is  NOT migraine free, however, and it is my recommendation Botox be continued at this time.     This patient has chronic migraines, defined as having 15 or more " headaches days per month, 8 of which are migraines, over a minimum of the last three months. Episodes last more than 4 hours (untreated).  Pt has 2 or more of following (see initial note) -  Headache worsened with activity, pain is moderate to severe intensity, pulsing in nature, unilateral and patient either has nausea/vomiting OR sensitivity to light and sound  This patient does not also have medication overuse headache.      No adverse effect of Botox noted at conclusion of today's appointment.     Follow up 11/8/2022  for Botox or sooner if needed.

## 2022-08-24 ENCOUNTER — OFFICE VISIT (OUTPATIENT)
Dept: URGENT CARE | Facility: CLINIC | Age: 32
End: 2022-08-24
Payer: COMMERCIAL

## 2022-08-24 VITALS
TEMPERATURE: 97.7 F | RESPIRATION RATE: 16 BRPM | HEART RATE: 75 BPM | BODY MASS INDEX: 35 KG/M2 | HEIGHT: 71 IN | WEIGHT: 250 LBS | OXYGEN SATURATION: 96 % | DIASTOLIC BLOOD PRESSURE: 88 MMHG | SYSTOLIC BLOOD PRESSURE: 138 MMHG

## 2022-08-24 DIAGNOSIS — B96.89 ACUTE BACTERIAL SINUSITIS: ICD-10-CM

## 2022-08-24 DIAGNOSIS — J01.90 ACUTE BACTERIAL SINUSITIS: ICD-10-CM

## 2022-08-24 PROCEDURE — 99213 OFFICE O/P EST LOW 20 MIN: CPT | Performed by: PHYSICIAN ASSISTANT

## 2022-08-24 RX ORDER — AMOXICILLIN AND CLAVULANATE POTASSIUM 875; 125 MG/1; MG/1
1 TABLET, FILM COATED ORAL 2 TIMES DAILY
Qty: 14 TABLET | Refills: 0 | Status: SHIPPED | OUTPATIENT
Start: 2022-08-24 | End: 2022-08-31

## 2022-08-24 NOTE — PROGRESS NOTES
Subjective:   Librado Johnston is a 32 y.o. male who presents for Nasal Congestion (X2weeks, Pressure in forehead, cheeks feel puffy cheek bone pain )      HPI  Patient is a 32-year-old male who presents to the urgent care with complaints of pressure in his forehead, cheeks, nasal congestion.  Onset of symptoms approximately 2 weeks ago with cold symptoms and 12 days ago he developed nasal congestion.  His symptoms got better and then worse.  He reports a pressure to his right cheekbone and discomfort.  Decongestant over-the-counter medications without much relief.  He saw his dentist yesterday and he was told there is nothing wrong with his teeth.  He reports a right-sided frontal head pressure and intermittent frontal headaches.  Mild intermittent lingering cough with phlegm but this has improved. Denies any recent fever, chills, chest pain, SOB, vision changes, dizziness . Negative COVID test at home.         Medications:    ALPRAZolam Tabs  amphetamine-dextroamphetamine Tabs  cloNIDine Tabs  cyclobenzaprine Tabs  DAILY MULTIVITAMIN PO  ketorolac Soln  naproxen sodium  propranolol Tabs    Allergies: Patient has no known allergies.    Problem List: Librado Johnston does not have any pertinent problems on file.    Surgical History:  Past Surgical History:   Procedure Laterality Date    TURBINATE REDUCTION Bilateral 9/4/2018    Procedure: TURBINATE REDUCTION - SUBMUCOSAL APPROACH;  Surgeon: Alexis Syed M.D.;  Location: SURGERY SAME DAY [facility];  Service: Ent    SEPTOPLASTY Bilateral 9/4/2018    Procedure: SEPTOPLASTY;  Surgeon: Alexis Syed M.D.;  Location: SURGERY SAME DAY [facility];  Service: Ent       Past Social Hx: Librado Johnston  reports that he has never smoked. He has never used smokeless tobacco. He reports current alcohol use. He reports that he does not use drugs.     Past Family Hx:  Librado Johnston family history includes Depression in his mother.     Problem list, medications, and  "allergies reviewed by myself today in Epic.     Objective:     /88   Pulse 75   Temp 36.5 °C (97.7 °F) (Temporal)   Resp 16   Ht 1.803 m (5' 11\")   Wt 113 kg (250 lb)   SpO2 96%   BMI 34.87 kg/m²     Physical Exam  Vitals reviewed.   Constitutional:       General: He is not in acute distress.     Appearance: Normal appearance. He is not ill-appearing or toxic-appearing.   HENT:      Right Ear: Tympanic membrane normal.      Left Ear: Tympanic membrane normal.      Nose: Mucosal edema and rhinorrhea present. Rhinorrhea is purulent.      Right Sinus: No maxillary sinus tenderness or frontal sinus tenderness.      Left Sinus: No maxillary sinus tenderness or frontal sinus tenderness.      Mouth/Throat:      Mouth: Mucous membranes are moist.      Pharynx: Oropharynx is clear. No oropharyngeal exudate or posterior oropharyngeal erythema.   Eyes:      Conjunctiva/sclera: Conjunctivae normal.      Pupils: Pupils are equal, round, and reactive to light.   Cardiovascular:      Rate and Rhythm: Normal rate and regular rhythm.      Heart sounds: Normal heart sounds.   Pulmonary:      Effort: Pulmonary effort is normal. No respiratory distress.      Breath sounds: Normal breath sounds. No wheezing, rhonchi or rales.   Musculoskeletal:      Cervical back: Neck supple.   Skin:     General: Skin is warm and dry.   Neurological:      General: No focal deficit present.      Mental Status: He is alert and oriented to person, place, and time.   Psychiatric:         Mood and Affect: Mood normal.         Behavior: Behavior normal.       Diagnosis and associated orders:     1. Acute bacterial sinusitis  - amoxicillin-clavulanate (AUGMENTIN) 875-125 MG Tab; Take 1 Tablet by mouth 2 times a day for 7 days.  Dispense: 14 Tablet; Refill: 0     Comments/MDM:     Patient's presenting symptoms and exam findings are consistent with sinusitis. Symptom onset 14 days ago, improved then worsened. Concern for acute bacterial sinusitis. " Therefore, I feel antibiotics are indicated at this time.   I recommend plenty of fluids, Flonase nasal spray, nasal saline washes or paris pot, Mucinex decongestant, Ibuprofen or Tylenol for pain, non-drowsy antihistamine such as Zyrtec or Claritin.      I personally reviewed prior external notes and test results pertinent to today's visit. Pathogenesis of diagnosis discussed including typical length and natural progression. Supportive care, natural history, differential diagnoses, and indications for immediate follow-up discussed. Patient expresses understanding and agrees to plan. Patient denies any other questions or concerns.     Follow-up with the primary care physician for recheck, reevaluation, and consideration of further management.    Please note that this dictation was created using voice recognition software. I have made a reasonable attempt to correct obvious errors, but I expect that there are errors of grammar and possibly content that I did not discover before finalizing the note.    This note was electronically signed by Valentino Monaco PA-C

## 2022-11-09 ENCOUNTER — TELEPHONE (OUTPATIENT)
Dept: NEUROLOGY | Facility: MEDICAL CENTER | Age: 32
End: 2022-11-09
Payer: COMMERCIAL

## 2022-11-09 NOTE — TELEPHONE ENCOUNTER
Established Patient     [facility] Patient is checked in Patient Demographics? Yes    Is visit type and length correct?  Yes    Is referral attached to visit? Yes    Were records received from referring provider? Yes    Patient was not contacted to have someone accompany them to visit?    Is this appointment scheduled as a Hospital Follow-Up?  No    Does the patient require any pre procedure or post procedure follow up? No    If any orders were placed at last visit or intended to be done for this visit do we have Results/Consult Notes? Yes  Labs - Labs were not ordered at last office visit.  Imaging - Imaging was not ordered at last office visit.  Referrals - No referrals were ordered at last office visit.        10.  If patient appointment is for Botox - is order pended for provider? Yes

## 2022-11-17 ENCOUNTER — OFFICE VISIT (OUTPATIENT)
Dept: NEUROLOGY | Facility: MEDICAL CENTER | Age: 32
End: 2022-11-17
Attending: NURSE PRACTITIONER
Payer: COMMERCIAL

## 2022-11-17 VITALS
SYSTOLIC BLOOD PRESSURE: 132 MMHG | TEMPERATURE: 96.7 F | OXYGEN SATURATION: 95 % | HEIGHT: 71 IN | BODY MASS INDEX: 36.51 KG/M2 | DIASTOLIC BLOOD PRESSURE: 88 MMHG | HEART RATE: 97 BPM | WEIGHT: 260.8 LBS

## 2022-11-17 DIAGNOSIS — G43.709 CHRONIC MIGRAINE W/O AURA W/O STATUS MIGRAINOSUS, NOT INTRACTABLE: ICD-10-CM

## 2022-11-17 PROCEDURE — 64615 CHEMODENERV MUSC MIGRAINE: CPT | Performed by: NURSE PRACTITIONER

## 2022-11-17 PROCEDURE — 700111 HCHG RX REV CODE 636 W/ 250 OVERRIDE (IP): Performed by: NURSE PRACTITIONER

## 2022-11-17 PROCEDURE — 700101 HCHG RX REV CODE 250: Performed by: NURSE PRACTITIONER

## 2022-11-17 RX ORDER — DEXTROAMPHETAMINE SACCHARATE, AMPHETAMINE ASPARTATE, DEXTROAMPHETAMINE SULFATE AND AMPHETAMINE SULFATE 5; 5; 5; 5 MG/1; MG/1; MG/1; MG/1
TABLET ORAL
COMMUNITY
Start: 2022-09-30 | End: 2023-10-24

## 2022-11-17 RX ORDER — KETOROLAC TROMETHAMINE 30 MG/ML
60 INJECTION, SOLUTION INTRAMUSCULAR; INTRAVENOUS
Qty: 4 ML | Refills: 11 | Status: SHIPPED | OUTPATIENT
Start: 2022-11-17 | End: 2023-10-24

## 2022-11-17 RX ORDER — NAPROXEN SODIUM 550 MG/1
550 TABLET ORAL 2 TIMES DAILY PRN
Qty: 20 TABLET | Refills: 11 | Status: SHIPPED | OUTPATIENT
Start: 2022-11-17 | End: 2023-10-24

## 2022-11-17 RX ADMIN — SODIUM CHLORIDE 155 UNITS: 9 INJECTION INTRAMUSCULAR; INTRAVENOUS; SUBCUTANEOUS at 08:56

## 2022-11-17 NOTE — PROGRESS NOTES
Subjective     Librado Johnston is a 32 y.o. male who presents for Botox for chronic migraine.          He began getting Botox for chronic migraine in 12/2019, it has reduced his 4/30 migraine days  from 30/30  to approximately 4-8 headache days (no migraine with Botox) per month.  He uses naproxen and cyclobenzaprine.         The diclofenac tablet didn't work very well, naproxen worked better.      He does have some ear pressure, he was told it was likely associated with headaches, he has been evaluated by ENT        Onset: November 2017.    Triggers:  Odors, allergies, sinus irritation.  Alleviating factors:, naproxen, Botox., shower  Meds tried and result:  Inderal, sumatriptan and rizatriptan at times help, for severe migraines, it doesn't help. Indocin did not help, amitriptyline  didn't work topamax didn't work. Nurtec didn't work, naproxen and rizatriptan are not really helping any more.   Caffeine use:  20 ounces of coffee 4 days weekly  OTC medications--frequency:none   Smoking:  None  Sleep apnea:  No  Family Hx:  Brother and possibly mother with migraines  How many per month: 4-8  Missed days of school/work:  None   Quality:  Frontal throbbing and pulsating, mild headaches back of head  N&V:  None  Photo/phonophobia:   none  Aura:   none       Current Outpatient Medications on File Prior to Visit   Medication Sig Dispense Refill    cloNIDine (CATAPRES) 0.1 MG Tab TAKE 1 TABLET BY MOUTH EVERY DAY AS NEEDED FOR ANXIETY      cyclobenzaprine (FLEXERIL) 10 mg Tab Take 1 Tablet by mouth 2 times a day as needed for Moderate Pain (headache). 20 Tablet 11    ALPRAZolam (XANAX) 0.5 MG Tab TAKE 0.5 - 1 TABLET BY MOUTH ONCE A DAY AS NEEDED FOR ANXIETY FOR 30 DAYS      propranolol (INDERAL) 10 MG Tab Take 1 Tablet by mouth 1 time a day as needed.  1    amphetamine-dextroamphetamine (ADDERALL) 10 MG Tab Take 0.5 Tablets by mouth 2 times a day.      Multiple Vitamins-Minerals (DAILY MULTIVITAMIN PO) Take 1 Tab by mouth  "every day.      amphetamine-dextroamphetamine (ADDERALL) 20 MG Tab TAKE 1/2 TABLET BY MOUTH TWICE DAILY AS DIRECTED       No current facility-administered medications on file prior to visit.     Past Medical History:   Diagnosis Date    ADD (attention deficit disorder)     Anxiety     Environmental and seasonal allergies     Hypertension     Snoring     no sleep study        Social History     Tobacco Use    Smoking status: Never    Smokeless tobacco: Never   Vaping Use    Vaping Use: Never used   Substance Use Topics    Alcohol use: Yes     Comment: social     Drug use: No      Objective   Encounter Vitals  Standard Vitals  Vitals  Blood Pressure: 132/88  Temperature: 35.9 °C (96.7 °F)  Temp src: Temporal  Pulse: 97  Pulse Oximetry: 95 %  Height: 180.3 cm (5' 11\")  Weight: 118 kg (260 lb 12.9 oz)  Encounter Vitals  Temperature: 35.9 °C (96.7 °F)  Temp src: Temporal  Blood Pressure: 132/88  Pulse: 97  Pulse Oximetry: 95 %  Weight: 118 kg (260 lb 12.9 oz)  Height: 180.3 cm (5' 11\")  BMI (Calculated): 36.37          Assessment & Plan       1. Chronic migraine w/o aura w/o status migrainosus, not intractable    - onabotulinum toxin A (Botox) injection 155 Units     Chronic Migraine:  Botox therapy has reduced patient’s migraines by more than 7 days and/or 100 hours per month.     I treated this patient in clinic today with BotoxA 155 units according to the dosing/injection paradigm currently mandated by the FDA for the management of chronic migraine. Specifically, I injected 5 units to the procerus, 5 units to the corrugators bilaterally, a total of 20 units to the frontalis musculature, 20 units to the temporalis bilaterally, 15 units to the occipitalis bilaterally, 10 units to the cervical paraspinals bilaterally and 15 units to the trapezius musculature bilaterally. The remainder of the Botox 200 units mixed but not administered was discarded as wastage per FDA guidelines  Consent on file.  Patient identify verified " with 2 patient identifiers.     Frequency of headaches is >15 days monthly with at least 8 migraines monthly; Migraines include at least two of the following: worsened with activity or avoidance of activity with migraines (ie they go lie down), moderate to severe pain intensity, pulsing headache, unilateral headache and has  have either nausea or vomiting OR sensitivity to light and sound.     Although this patient is responding to botox, the patient is  NOT migraine free, however, and it is my recommendation Botox be continued at this time.    This patient has chronic migraines, defined as having 15 or more headaches days per month, 8 of which are migraines, over a minimum of the last three months. Episodes last more than 4 hours (untreated).  Pt has 2 or more of following (see initial note) -  Headache worsened with activity, pain is moderate to severe intensity, pulsing in nature, unilateral and patient either has nausea/vomiting OR sensitivity to light and sound.   This patient does not also have medication overuse headache.       No adverse effect of Botox noted at conclusion of today's appointment.    Follow up in 12 weeks for Botox or sooner if needed.           Continue Toradol injections, Flexeril & Naproxen for rescue.

## 2023-02-10 ENCOUNTER — TELEPHONE (OUTPATIENT)
Dept: NEUROLOGY | Facility: MEDICAL CENTER | Age: 33
End: 2023-02-10
Payer: COMMERCIAL

## 2023-02-22 ENCOUNTER — OFFICE VISIT (OUTPATIENT)
Dept: NEUROLOGY | Facility: MEDICAL CENTER | Age: 33
End: 2023-02-22
Attending: PSYCHIATRY & NEUROLOGY
Payer: COMMERCIAL

## 2023-02-22 VITALS
DIASTOLIC BLOOD PRESSURE: 82 MMHG | TEMPERATURE: 97.7 F | BODY MASS INDEX: 37.05 KG/M2 | OXYGEN SATURATION: 98 % | WEIGHT: 265.65 LBS | HEART RATE: 80 BPM | RESPIRATION RATE: 17 BRPM | SYSTOLIC BLOOD PRESSURE: 124 MMHG

## 2023-02-22 DIAGNOSIS — G43.709 CHRONIC MIGRAINE W/O AURA W/O STATUS MIGRAINOSUS, NOT INTRACTABLE: Primary | ICD-10-CM

## 2023-02-22 PROCEDURE — 64615 CHEMODENERV MUSC MIGRAINE: CPT | Performed by: PSYCHIATRY & NEUROLOGY

## 2023-02-22 PROCEDURE — 700101 HCHG RX REV CODE 250: Performed by: PSYCHIATRY & NEUROLOGY

## 2023-02-22 PROCEDURE — 700111 HCHG RX REV CODE 636 W/ 250 OVERRIDE (IP)

## 2023-02-22 PROCEDURE — 700111 HCHG RX REV CODE 636 W/ 250 OVERRIDE (IP): Performed by: PSYCHIATRY & NEUROLOGY

## 2023-02-22 RX ADMIN — SODIUM CHLORIDE 155 UNITS: 9 INJECTION INTRAMUSCULAR; INTRAVENOUS; SUBCUTANEOUS at 07:45

## 2023-02-22 ASSESSMENT — PATIENT HEALTH QUESTIONNAIRE - PHQ9
CLINICAL INTERPRETATION OF PHQ2 SCORE: 1
SUM OF ALL RESPONSES TO PHQ QUESTIONS 1-9: 2
5. POOR APPETITE OR OVEREATING: 0 - NOT AT ALL

## 2023-02-22 NOTE — PROGRESS NOTES
[facility]  BOTOX PROCEDURE NOTE    Chronic Migraine:  Botox therapy has reduced patient’s migraines by more than 7 days and/or 100 hours per month.     I treated Librado Johnston in clinic today with BotoxA 155 units according to the dosing/injection paradigm currently mandated by the FDA for the management of chronic migraine.  Specifically, I injected:  - 5 units to the procerus,  - 5 units to the corrugators (bilaterally),  - a total of 20 units to the frontalis musculature,  - 20 units to the temporalis (bilaterally),  - 15 units to the occipitalis (bilaterally),  - 10 units to the cervical paraspinals (bilaterally), and  - 15 units to the trapezius musculature (bilaterally).    The remainder of the Botox was discarded as wastage per FDA guidelines  Consent on file.  Patient identify verified with 2 patient identifiers.     Frequency of headaches is >15 days monthly with at least 8 migraines monthly.    Migraines include at least two of the following: worsened with activity or avoidance of activity with migraines (ie they go lie down), moderate to severe pain intensity, pulsing headache, unilateral headache and has  have either nausea or vomiting OR sensitivity to light and sound.     Although Librado Johnston is responding to botox he is NOT migraine free.  I recommend that Botox be continued at this time.    Librado Johnston has chronic migraines, defined as having 15 or more headaches days per month, 8 of which are migraines, over a minimum of the last three months.  Episodes last more than 4 hours (untreated).  Pt has 2 or more of following (see initial note):  - headache worsened with activity  - pain is moderate to severe intensity  - pulsing in nature  - unilateral   and patient either has nausea/vomiting OR sensitivity to light and sound.    No adverse effect of Botox noted at conclusion of today's appointment.    Follow up in 12 weeks for Botox or sooner if needed.    Signed: Yakov Ponce  M.D.

## 2023-05-17 ENCOUNTER — OFFICE VISIT (OUTPATIENT)
Dept: NEUROLOGY | Facility: MEDICAL CENTER | Age: 33
End: 2023-05-17
Attending: PSYCHIATRY & NEUROLOGY
Payer: COMMERCIAL

## 2023-05-17 VITALS
HEART RATE: 82 BPM | BODY MASS INDEX: 37.04 KG/M2 | WEIGHT: 264.55 LBS | OXYGEN SATURATION: 96 % | HEIGHT: 71 IN | SYSTOLIC BLOOD PRESSURE: 126 MMHG | DIASTOLIC BLOOD PRESSURE: 84 MMHG | TEMPERATURE: 98.6 F

## 2023-05-17 DIAGNOSIS — G51.0 BELL'S PALSY: ICD-10-CM

## 2023-05-17 DIAGNOSIS — G43.709 CHRONIC MIGRAINE W/O AURA W/O STATUS MIGRAINOSUS, NOT INTRACTABLE: ICD-10-CM

## 2023-05-17 PROCEDURE — 3079F DIAST BP 80-89 MM HG: CPT | Performed by: PSYCHIATRY & NEUROLOGY

## 2023-05-17 PROCEDURE — 99212 OFFICE O/P EST SF 10 MIN: CPT | Performed by: PSYCHIATRY & NEUROLOGY

## 2023-05-17 PROCEDURE — 3074F SYST BP LT 130 MM HG: CPT | Performed by: PSYCHIATRY & NEUROLOGY

## 2023-05-17 PROCEDURE — 99214 OFFICE O/P EST MOD 30 MIN: CPT | Performed by: PSYCHIATRY & NEUROLOGY

## 2023-05-17 RX ORDER — PREDNISONE 20 MG/1
TABLET ORAL
COMMUNITY
Start: 2023-05-13 | End: 2023-10-24

## 2023-05-17 RX ORDER — ACYCLOVIR 800 MG/1
800 TABLET ORAL
COMMUNITY
Start: 2023-05-13 | End: 2023-05-20

## 2023-05-17 RX ORDER — MELOXICAM 15 MG/1
15 TABLET ORAL
COMMUNITY
Start: 2023-03-20

## 2023-05-24 NOTE — PROGRESS NOTES
"[facility] NEUROLOGY  GENERAL NEUROLOGY  FOLLOW-UP VISIT    CC: chronic migraine    INTERVAL HISTORY:  Librado Johnston is a 32 y.o. man with chronic migraine w/o aura and anxiety.  I last saw him in the clinic on 2/22/2023 at the time of Botox administration.  At that time I recommended he continue Botox.  Today, he was unaccompanied, and he provided the following interval history:    5/10/2023:  Librado woke up and felt dizzy and nauseated.  He felt ill the entire day.  There was right parietal headache.    5/11/2023:  He felt a little bit better.    5/12/2023:  He got up, walked his dog, worked out, and noticed \"tingling\" on the right side of the tongue.    5/13/2023:  Librado noticed right facial weakness.  He went to the ED where he was told that he had Bell's Palsy.  He was prescribed acyclovir and prednisone.    The following is a summary of headache symptoms, presented in my standard format:    Family History:   Age at onset: 28 (2018)  Location: top of head  Radiation: frontal  Frequency: baseline: daily, lately: 4 headaches/week  Duration: baseline: 3-4 hours, lately: 1 hour  Headache Days/Month: baseline: 28/30, lately: 8/30  Quality: \"pressure, pulsing\"  Intensity: baseline: 5-6/10, lately: 2/10  Aura: none  Photophobia/Phonophobia/Nausea/Vomiting: none/none/none/none  Provoked by Physical Activity?: none  Triggers: work stress, wearing glasses or headphones  Associated Symptoms: none  Autonomic Signs (such as ptosis, miosis, conjunctival injection, rhinorrhea, increased lacrimation): none  Head Trauma: played football and boxed in middle/high school  Association with Menses: n/a  ED Visits: none  Hospitalizations: none  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  Sleep: 6 hours/night on average, 7 hours/night during the weekends  Caffeine Intake: 1 cup/day, 4 days/week  Hydration: keeps well-hydrated  Nutrition: sometimes skips meals (dinner)  Exercise: yes  Analgesic Overuse: "     Current Medication Regimen:  - Botox: helpful    Medications Tried: Response  Preventive:  - amitriptyline: ineffective  - propranolol: ineffective    Rescue:  - rizatriptan: ineffective  - sumatriptan: ineffective  - Nurtec: helpful    Medications Not Tried:  -     He takes Adderall as needed (not every day)    MEDICATIONS:  Current Outpatient Medications   Medication Sig    acyclovir (ZOVIRAX) 800 MG Tab Take 800 mg by mouth.    meloxicam (MOBIC) 15 MG tablet Take 15 mg by mouth every day.    predniSONE (DELTASONE) 20 MG Tab TAKE 3 TABLETS BY MOUTH DAILY FOR 5 DAYS    ketorolac (TORADOL) 60 MG/2ML Solution Inject 2 mL into the shoulder, thigh, or buttocks 1 time a day as needed (MIGRAINE, NO MORE THAN 2XMONTH, 1 X WEEKLY).    naproxen sodium (ANAPROX) 550 MG tablet Take 1 Tablet by mouth 2 times a day as needed (MIGRAINE).    Omega-3 Fatty Acids (FISH OIL) 1200 MG CAPSULE DELAYED RELEASE Take  by mouth.    cloNIDine (CATAPRES) 0.1 MG Tab TAKE 1 TABLET BY MOUTH EVERY DAY AS NEEDED FOR ANXIETY    cyclobenzaprine (FLEXERIL) 10 mg Tab Take 1 Tablet by mouth 2 times a day as needed for Moderate Pain (headache).    ALPRAZolam (XANAX) 0.5 MG Tab TAKE 0.5 - 1 TABLET BY MOUTH ONCE A DAY AS NEEDED FOR ANXIETY FOR 30 DAYS    propranolol (INDERAL) 10 MG Tab Take 1 Tablet by mouth 1 time a day as needed.    amphetamine-dextroamphetamine (ADDERALL) 10 MG Tab Take 0.5 Tablets by mouth 2 times a day.    Multiple Vitamins-Minerals (DAILY MULTIVITAMIN PO) Take 1 Tab by mouth every day.    amphetamine-dextroamphetamine (ADDERALL) 20 MG Tab TAKE 1/2 TABLET BY MOUTH TWICE DAILY AS DIRECTED     MEDICAL, SOCIAL, AND FAMILY HISTORY:  There is no change in the patient's ROS or medical, social, or family histories since the previous visit on 2/22/2023.    REVIEW OF SYSTEMS:  A ROS was completed.  Pertinent positives and negatives were included in the HPI, above.  All other systems were reviewed and are negative.    PHYSICAL  "EXAM:  General/Medical:  - NAD    Neuro:  MENTAL STATUS: awake and alert; no deficits of speech or language; oriented to conversation; affect was appropriate to situation; pleasant, cooperative    CRANIAL NERVES:    II: acuity: NT, fields: NT, pupils: NT, discs: NT    III/IV/VI: versions: grossly intact    V: facial sensation: NT    VII: facial expression: right jovanny-facial weakness    VIII: hearing: intact to voice    IX/X: palate: NT    XI: shoulder shrug: NT    XII: tongue: NT    MOTOR:  - bulk: NT  - tone: NT  Upper Extremity Strength (R/L)    NT   Elbow flexion NT   Elbow extension NT   Shoulder abduction NT     Lower Extremity Strength (R/L)   Hip flexion NT   Knee extension NT   Knee flexion NT   Ankle plantarflexion NT   Ankle dorsiflexion NT     - pronator drift: NT  - abnormal movements: none    SENSATION:  - light touch: NT  - vibration (R/L, seconds): NT at the great toes  - pinprick: NT  - proprioception: NT  - Romberg: NT    COORDINATION:  - finger to nose: NT  - finger tapping: NT    REFLEXES:  Reflex Right Left   BR NT NT   Biceps NT NT   Triceps NT NT   Patellae NT NT   Achilles NT NT   Toes NT NT     GAIT:  - NT    REVIEW OF IMAGING STUDIES:  No data available.    REVIEW OF LABORATORY STUDIES:  No recent data available.    ASSESSMENT:  Librado Johnston is a 32 y.o. man with chronic headache and Bell's palsy (right) as well as anxiety.  Plans/recommendations as follows:    PLAN:  Chronic Headache:  - continue Botox for now  - will likely try pausing this after the next series of injections    Bell's Palsy (Right):  - no additional workup at this time  - take measures to protect against right eye desiccation    Follow-Up:  - Return in about 5 months (around 10/17/2023).    Signed: Yakov Ponce M.D.    BILLING DOCUMENTATION:   I spent 32 minutes reviewing the medical record, interviewing and examining the patient, discussing my impression (see \"assessment\" above), and coordinating care.  " Debridement Text: The wound edges were debrided prior to proceeding with the closure to facilitate wound healing.

## 2023-06-07 ENCOUNTER — OFFICE VISIT (OUTPATIENT)
Dept: NEUROLOGY | Facility: MEDICAL CENTER | Age: 33
End: 2023-06-07
Attending: PSYCHIATRY & NEUROLOGY
Payer: COMMERCIAL

## 2023-06-07 VITALS
DIASTOLIC BLOOD PRESSURE: 70 MMHG | BODY MASS INDEX: 37.25 KG/M2 | HEART RATE: 64 BPM | TEMPERATURE: 96.8 F | WEIGHT: 266.1 LBS | HEIGHT: 71 IN | SYSTOLIC BLOOD PRESSURE: 130 MMHG | OXYGEN SATURATION: 98 %

## 2023-06-07 DIAGNOSIS — G43.709 CHRONIC MIGRAINE W/O AURA W/O STATUS MIGRAINOSUS, NOT INTRACTABLE: Primary | ICD-10-CM

## 2023-06-07 PROCEDURE — 700111 HCHG RX REV CODE 636 W/ 250 OVERRIDE (IP): Performed by: PSYCHIATRY & NEUROLOGY

## 2023-06-07 PROCEDURE — 64615 CHEMODENERV MUSC MIGRAINE: CPT | Performed by: PSYCHIATRY & NEUROLOGY

## 2023-06-07 PROCEDURE — 3075F SYST BP GE 130 - 139MM HG: CPT | Performed by: PSYCHIATRY & NEUROLOGY

## 2023-06-07 PROCEDURE — 3078F DIAST BP <80 MM HG: CPT | Performed by: PSYCHIATRY & NEUROLOGY

## 2023-06-07 PROCEDURE — 700101 HCHG RX REV CODE 250: Performed by: PSYCHIATRY & NEUROLOGY

## 2023-06-07 RX ORDER — TRIAMCINOLONE ACETONIDE 1 MG/ML
1 LOTION TOPICAL 2 TIMES DAILY
COMMUNITY
Start: 2023-05-22

## 2023-06-07 RX ADMIN — SODIUM CHLORIDE 155 UNITS: 9 INJECTION, SOLUTION INTRAMUSCULAR; INTRAVENOUS; SUBCUTANEOUS at 07:43

## 2023-06-07 NOTE — PROGRESS NOTES
[facility]  BOTOX PROCEDURE NOTE    Chronic Migraine:  Botox therapy has reduced patient’s migraines by more than 7 days and/or 100 hours per month.     I treated Librado Johnston in clinic today with BotoxA 155 units according to the dosing/injection paradigm currently mandated by the FDA for the management of chronic migraine.  Specifically, I injected:  - 5 units to the procerus,  - 5 units to the corrugators (bilaterally),  - a total of 20 units to the frontalis musculature,  - 20 units to the temporalis (bilaterally),  - 15 units to the occipitalis (bilaterally),  - 10 units to the cervical paraspinals (bilaterally), and  - 15 units to the trapezius musculature (bilaterally).    The remainder of the Botox was discarded as wastage per FDA guidelines  Consent on file.  Patient identify verified with 2 patient identifiers.     Frequency of headaches is >15 days monthly with at least 8 migraines monthly.    Migraines include at least two of the following: worsened with activity or avoidance of activity with migraines (ie they go lie down), moderate to severe pain intensity, pulsing headache, unilateral headache and has  have either nausea or vomiting OR sensitivity to light and sound.     Although Librado Johnston is responding to botox s/he is NOT migraine free.  I recommend that Botox be continued at this time.    Librado Johnston has chronic migraines, defined as having 15 or more headaches days per month, 8 of which are migraines, over a minimum of the last three months.  Episodes last more than 4 hours (untreated).  Pt has 2 or more of following (see initial note):  - headache worsened with activity  - pain is moderate to severe intensity  - pulsing in nature  - unilateral   and patient either has nausea/vomiting OR sensitivity to light and sound.    No adverse effect of Botox noted at conclusion of today's appointment.    Follow up in 12 weeks for Botox or sooner if needed.    Signed: Yakov Ponce  M.D.

## 2023-10-24 ENCOUNTER — OFFICE VISIT (OUTPATIENT)
Dept: NEUROLOGY | Facility: MEDICAL CENTER | Age: 33
End: 2023-10-24
Attending: PSYCHIATRY & NEUROLOGY
Payer: COMMERCIAL

## 2023-10-24 VITALS
DIASTOLIC BLOOD PRESSURE: 100 MMHG | SYSTOLIC BLOOD PRESSURE: 142 MMHG | TEMPERATURE: 97.1 F | WEIGHT: 270.5 LBS | OXYGEN SATURATION: 100 % | HEIGHT: 71 IN | BODY MASS INDEX: 37.87 KG/M2 | HEART RATE: 65 BPM

## 2023-10-24 DIAGNOSIS — G44.229 CHRONIC TENSION-TYPE HEADACHE, NOT INTRACTABLE: ICD-10-CM

## 2023-10-24 PROCEDURE — 3080F DIAST BP >= 90 MM HG: CPT | Performed by: PSYCHIATRY & NEUROLOGY

## 2023-10-24 PROCEDURE — 99213 OFFICE O/P EST LOW 20 MIN: CPT | Performed by: PSYCHIATRY & NEUROLOGY

## 2023-10-24 PROCEDURE — 3077F SYST BP >= 140 MM HG: CPT | Performed by: PSYCHIATRY & NEUROLOGY

## 2023-10-24 PROCEDURE — 99212 OFFICE O/P EST SF 10 MIN: CPT | Performed by: PSYCHIATRY & NEUROLOGY

## 2023-10-24 RX ORDER — PROPRANOLOL HYDROCHLORIDE 40 MG/1
40 TABLET ORAL
COMMUNITY

## 2023-10-24 NOTE — PROGRESS NOTES
"[facility] NEUROLOGY  GENERAL NEUROLOGY  FOLLOW-UP VISIT    CC: chronic migraine    INTERVAL HISTORY:  Librado Johnston is a 33 y.o. man with tension headache and a history otherwise notable for Bell's Palsy (right) and anxiety.  I last saw him in the clinic on 6/7/2023 at the time of Botox administration.  At that time I recommended he continue Botox.  Today, he was unaccompanied, and he provided the following interval history:    The following is a summary of headache symptoms, presented in my standard format:    Family History:   Age at onset: 28 (2018)  Location: top of head  Radiation: frontal  Frequency: baseline: daily, lately: 4 headaches/week  Duration: baseline: 3-4 hours, lately: 1 hour  Headache Days/Month: baseline: 28/30, lately: 8/30  Quality: \"pressure, pulsing\"  Intensity: baseline: 5-6/10, lately: 2/10  Aura: none  Photophobia/Phonophobia/Nausea/Vomiting: none/none/none/none  Provoked by Physical Activity?: none  Triggers: work stress, wearing glasses or headphones  Associated Symptoms: none  Autonomic Signs (such as ptosis, miosis, conjunctival injection, rhinorrhea, increased lacrimation): none  Head Trauma: played football and boxed in middle/high school  Association with Menses: n/a  ED Visits: none  Hospitalizations: none  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  Sleep: 6 hours/night on average, 7 hours/night during the weekends  Caffeine Intake: 1 cup/day, 4 days/week  Hydration: keeps well-hydrated  Nutrition: sometimes skips meals (dinner)  Exercise: yes, he has been focusing on this more lately  Analgesic Overuse:     Current Medication Regimen:  -     Medications Tried: Response  Preventive:  - amitriptyline: ineffective  - propranolol: ineffective, prescribed for anxiety  - Botox: helpful, paused this, headaches aren't worse since discontinuation    Rescue:  - rizatriptan: ineffective  - sumatriptan: ineffective  - Nurtec: helpful    Medications Not " Tried:  -     He takes Adderall as needed (not every day)    MEDICATIONS:  Current Outpatient Medications   Medication Sig    propranolol (INDERAL) 40 MG Tab Take 40 mg by mouth 1 time a day as needed (anxiety).    triamcinolone (KENALOG) 0.1 % lotion Apply 1 Application topically 2 times a day. APPLY TOPICALLY TO THE AFFECTED AREA TWICE DAILY    meloxicam (MOBIC) 15 MG tablet Take 15 mg by mouth every day.    Omega-3 Fatty Acids (FISH OIL) 1200 MG CAPSULE DELAYED RELEASE Take  by mouth.    cloNIDine (CATAPRES) 0.1 MG Tab TAKE 1 TABLET BY MOUTH EVERY DAY AS NEEDED FOR ANXIETY    ALPRAZolam (XANAX) 0.5 MG Tab TAKE 0.5 - 1 TABLET BY MOUTH ONCE A DAY AS NEEDED FOR ANXIETY FOR 30 DAYS    amphetamine-dextroamphetamine (ADDERALL) 10 MG Tab Take 0.5 Tablets by mouth 2 times a day.    Multiple Vitamins-Minerals (DAILY MULTIVITAMIN PO) Take 1 Tab by mouth every day.     MEDICAL, SOCIAL, AND FAMILY HISTORY:  There is no change in the patient's ROS or medical, social, or family histories since the previous visit on 6/7/2023.    REVIEW OF SYSTEMS:  A ROS was completed.  Pertinent positives and negatives were included in the HPI, above.  All other systems were reviewed and are negative.    PHYSICAL EXAM:  General/Medical:  - NAD    Neuro:  MENTAL STATUS: awake and alert; no deficits of speech or language; oriented to conversation; affect was appropriate to situation; pleasant, cooperative    CRANIAL NERVES:    II: acuity: NT, fields: NT, pupils: NT, discs: NT    III/IV/VI: versions: grossly intact    V: facial sensation: NT    VII: facial expression: symmetric    VIII: hearing: intact to voice    IX/X: palate: NT    XI: shoulder shrug: NT    XII: tongue: NT    MOTOR:  - bulk: NT  - tone: NT  Upper Extremity Strength (R/L)    NT   Elbow flexion NT   Elbow extension NT   Shoulder abduction NT     Lower Extremity Strength (R/L)   Hip flexion NT   Knee extension NT   Knee flexion NT   Ankle plantarflexion NT   Ankle dorsiflexion  "NT     - pronator drift: NT  - abnormal movements: none    SENSATION:  - light touch: NT  - vibration (R/L, seconds): NT at the great toes  - pinprick: NT  - proprioception: NT  - Romberg: NT    COORDINATION:  - finger to nose: NT  - finger tapping: NT    REFLEXES:  Reflex Right Left   BR NT NT   Biceps NT NT   Triceps NT NT   Patellae NT NT   Achilles NT NT   Toes NT NT     GAIT:  - NT    REVIEW OF IMAGING STUDIES:  No data available.    REVIEW OF LABORATORY STUDIES:  No additional data since the last visit.    ASSESSMENT:  Librado Johnston is a 33 y.o. man with tension headache and a history otherwise notable for Bell's palsy (right) and anxiety.  Plans/recommendations as follows:    PLAN:  Tension Headache:  - remain off Botox  - continue regular exercise  - continue adequate nutrition and hydration  - continue to limit caffeine intake    Follow-Up:  - Return if symptoms worsen or fail to improve.    Signed: Yakov Ponce M.D.    BILLING DOCUMENTATION:   I spent 24 minutes reviewing the medical record, interviewing and examining the patient, discussing my impression (see \"assessment\" above), and coordinating care.  "

## 2024-01-16 ENCOUNTER — TELEPHONE (OUTPATIENT)
Dept: NEUROLOGY | Facility: MEDICAL CENTER | Age: 34
End: 2024-01-16

## 2024-01-16 NOTE — TELEPHONE ENCOUNTER
Hello. The patient will received 2 bills one for the facility and one for the providers who ar independent contractors. Amarilsi Cline MA.

## 2024-01-16 NOTE — TELEPHONE ENCOUNTER
Caller:  Librado    Topic/issue: Librado has a VV appt tomorrow, and is wondering if he would be charged the facility charge also.     Callback Number: [phone]    Thank you,   Mary JOHANSEN

## 2024-01-17 ENCOUNTER — TELEMEDICINE (OUTPATIENT)
Dept: NEUROLOGY | Facility: MEDICAL CENTER | Age: 34
End: 2024-01-17
Attending: PSYCHIATRY & NEUROLOGY
Payer: COMMERCIAL

## 2024-01-17 VITALS — BODY MASS INDEX: 37.74 KG/M2 | HEIGHT: 71 IN

## 2024-01-17 DIAGNOSIS — G44.229 CHRONIC TENSION-TYPE HEADACHE, NOT INTRACTABLE: Primary | ICD-10-CM

## 2024-01-17 PROCEDURE — 99213 OFFICE O/P EST LOW 20 MIN: CPT | Performed by: PSYCHIATRY & NEUROLOGY

## 2024-01-17 RX ORDER — BACLOFEN 5 MG/1
10 TABLET ORAL
Qty: 30 TABLET | Refills: 11 | Status: SHIPPED | OUTPATIENT
Start: 2024-01-17 | End: 2024-02-16

## 2024-01-17 RX ORDER — ESZOPICLONE 1 MG/1
1 TABLET, FILM COATED ORAL EVERY EVENING
COMMUNITY
Start: 2024-01-02

## 2024-01-17 ASSESSMENT — PATIENT HEALTH QUESTIONNAIRE - PHQ9: CLINICAL INTERPRETATION OF PHQ2 SCORE: 0

## 2024-01-17 NOTE — PROGRESS NOTES
"[facility] NEUROLOGY  GENERAL NEUROLOGY  FOLLOW-UP VISIT    CC: tension headache    INTERVAL HISTORY:  Librado Johnston is a 33 y.o. man with tension headache and a history otherwise notable for Bell's Palsy (right) and anxiety.  I last saw him in the clinic on 10/24/2023.  At that time I recommended follow up as needed.  Today, he was unaccompanied, and he provided the following interval history:    The following is a summary of headache symptoms, presented in my standard format:    Family History:   Age at onset: 28 (2018)  Location: top of head  Radiation: frontal  Frequency: baseline: daily, lately: 2 headaches/week  Duration: baseline: 3-4 hours, lately: 15-20 minutes  Headache Days/Month: baseline: 28/30, lately: 8/30  Quality: \"pressure, pulsing\"  Intensity: baseline: 5-6/10, lately: 2/10  Aura: none  Photophobia/Phonophobia/Nausea/Vomiting: none/none/none/none  Provoked by Physical Activity?: none  Triggers: work stress, wearing glasses or headphones  Associated Symptoms: none  Autonomic Signs (such as ptosis, miosis, conjunctival injection, rhinorrhea, increased lacrimation): none  Head Trauma: played football and boxed in middle/high school  Association with Menses: n/a  ED Visits: none  Hospitalizations: none  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  Sleep: 6 hours/night on average, 7 hours/night during the weekends  Caffeine Intake: 1 cup/day, 4 days/week  Hydration: keeps well-hydrated  Nutrition: sometimes skips meals (dinner)  Exercise: yes, he has been focusing on this more lately  Analgesic Overuse:     Current Medication Regimen:  -     Medications Tried: Response  Preventive:  - amitriptyline: ineffective  - propranolol: ineffective, prescribed for anxiety  - Botox: helpful, paused this, headaches aren't worse since discontinuation    Rescue:  - rizatriptan: ineffective  - sumatriptan: ineffective  - Nurtec: helpful    Medications Not Tried:  -     He takes Adderall " as needed (not every day)    MEDICATIONS:  Current Outpatient Medications   Medication Sig    eszopiclone (LUNESTA) 1 MG Tab tablet Take 1 mg by mouth every evening.    propranolol (INDERAL) 40 MG Tab Take 40 mg by mouth 1 time a day as needed (anxiety).    triamcinolone (KENALOG) 0.1 % lotion Apply 1 Application topically 2 times a day. APPLY TOPICALLY TO THE AFFECTED AREA TWICE DAILY    meloxicam (MOBIC) 15 MG tablet Take 15 mg by mouth every day.    Omega-3 Fatty Acids (FISH OIL) 1200 MG CAPSULE DELAYED RELEASE Take  by mouth.    cloNIDine (CATAPRES) 0.1 MG Tab TAKE 1 TABLET BY MOUTH EVERY DAY AS NEEDED FOR ANXIETY    ALPRAZolam (XANAX) 0.5 MG Tab TAKE 0.5 - 1 TABLET BY MOUTH ONCE A DAY AS NEEDED FOR ANXIETY FOR 30 DAYS    amphetamine-dextroamphetamine (ADDERALL) 10 MG Tab Take 10 mg by mouth 2 times a day.    Multiple Vitamins-Minerals (DAILY MULTIVITAMIN PO) Take 1 Tab by mouth every day.     MEDICAL, SOCIAL, AND FAMILY HISTORY:  There is no change in the patient's ROS or medical, social, or family histories since the previous visit on 10/24/2023.    REVIEW OF SYSTEMS:  A ROS was completed.  Pertinent positives and negatives were included in the HPI, above.  All other systems were reviewed and are negative.    PHYSICAL EXAM:  General/Medical:  - NAD    Neuro:  MENTAL STATUS: awake and alert; no deficits of speech or language; oriented to conversation; affect was appropriate to situation; pleasant, cooperative    CRANIAL NERVES:    II: acuity: NT, fields: NT, pupils: NT, discs: NT    III/IV/VI: versions: grossly intact    V: facial sensation: NT    VII: facial expression: symmetric    VIII: hearing: intact to voice    IX/X: palate: NT    XI: shoulder shrug: NT    XII: tongue: NT    MOTOR:  - bulk: NT  - tone: NT  Upper Extremity Strength (R/L)    NT   Elbow flexion NT   Elbow extension NT   Shoulder abduction NT     Lower Extremity Strength (R/L)   Hip flexion NT   Knee extension NT   Knee flexion NT   Ankle  plantarflexion NT   Ankle dorsiflexion NT     - pronator drift: NT  - abnormal movements: none    SENSATION:  - light touch: NT  - vibration (R/L, seconds): NT at the great toes  - pinprick: NT  - proprioception: NT  - Romberg: NT    COORDINATION:  - finger to nose: NT  - finger tapping: NT    REFLEXES:  Reflex Right Left   BR NT NT   Biceps NT NT   Triceps NT NT   Patellae NT NT   Achilles NT NT   Toes NT NT     GAIT:  - NT    REVIEW OF IMAGING STUDIES:  No data available.    REVIEW OF LABORATORY STUDIES:  No additional data since the last visit.    ASSESSMENT:  Librado Johnston is a 33 y.o. man with tension headache and a history otherwise notable for Bell's palsy (right) and anxiety.  Plans/recommendations as follows:    PLAN:  Tension Headache:  - trial of baclofen 5-10 mg daily PRN  - if ineffective or not tolerated, can resume Flexaril    Follow-Up:  - Return if symptoms worsen or fail to improve.    Signed: Yakov Ponce M.D.

## 2024-06-04 ENCOUNTER — OFFICE VISIT (OUTPATIENT)
Dept: URGENT CARE | Facility: CLINIC | Age: 34
End: 2024-06-04
Payer: COMMERCIAL

## 2024-06-04 VITALS
WEIGHT: 273 LBS | DIASTOLIC BLOOD PRESSURE: 88 MMHG | RESPIRATION RATE: 20 BRPM | OXYGEN SATURATION: 98 % | SYSTOLIC BLOOD PRESSURE: 136 MMHG | TEMPERATURE: 98.4 F | HEART RATE: 70 BPM | BODY MASS INDEX: 38.22 KG/M2 | HEIGHT: 71 IN

## 2024-06-04 DIAGNOSIS — H66.001 ACUTE SUPPURATIVE OTITIS MEDIA OF RIGHT EAR WITHOUT SPONTANEOUS RUPTURE OF TYMPANIC MEMBRANE, RECURRENCE NOT SPECIFIED: ICD-10-CM

## 2024-06-04 PROCEDURE — 3079F DIAST BP 80-89 MM HG: CPT | Performed by: NURSE PRACTITIONER

## 2024-06-04 PROCEDURE — 99213 OFFICE O/P EST LOW 20 MIN: CPT | Performed by: NURSE PRACTITIONER

## 2024-06-04 PROCEDURE — 3075F SYST BP GE 130 - 139MM HG: CPT | Performed by: NURSE PRACTITIONER

## 2024-06-04 RX ORDER — AMOXICILLIN 875 MG/1
875 TABLET, COATED ORAL 2 TIMES DAILY
Qty: 14 TABLET | Refills: 0 | Status: SHIPPED | OUTPATIENT
Start: 2024-06-04 | End: 2024-06-11

## 2024-06-04 NOTE — PROGRESS NOTES
"Librado Johnston is a 33 y.o. male who presents for Otalgia (X2 days, Right ear, \" Started hurting after jumping [place] the lake.\" )      HPI  This is a new problem. Librado Johnston is a 33 y.o. patient who presents to urgent care with c/o: Ear pain for 2 days.  Started after he jumped into a lake.  Pain is gotten worse.  He has been using some rubbing alcohol as well as home treatments to clear his ear but the pain continues to worsen.  He feels perfectly well healthy and well other than his ear discomfort.  No other aggravating relieving factors.  Denies fever, chills, nausea, vomiting, sore throat, cough or congestion. No other aggravating or alleviating factors.  Denies any other concerns at this time.       ROS See HPI    Allergies:     No Known Allergies    PMSFS Hx:  Past Medical History:   Diagnosis Date    ADD (attention deficit disorder)     Anxiety     Environmental and seasonal allergies     Hypertension     Snoring     no sleep study     Past Surgical History:   Procedure Laterality Date    TURBINATE REDUCTION Bilateral 9/4/2018    Procedure: TURBINATE REDUCTION - SUBMUCOSAL APPROACH;  Surgeon: Alexis Syed M.D.;  Location: SURGERY SAME DAY [facility] ORS;  Service: Ent    SEPTOPLASTY Bilateral 9/4/2018    Procedure: SEPTOPLASTY;  Surgeon: Alexis Syed M.D.;  Location: SURGERY SAME DAY [facility] ORS;  Service: Ent     Family History   Problem Relation Age of Onset    Depression Mother     Bipolar disorder Neg Hx     Schizophrenia Neg Hx     Suicide Attempts Neg Hx      Social History     Tobacco Use    Smoking status: Never     Passive exposure: Never    Smokeless tobacco: Never   Substance Use Topics    Alcohol use: Yes     Comment: social        Problems:   Patient Active Problem List   Diagnosis    Anxiety    NASIR (generalized anxiety disorder)    Tension headache    Primary insomnia    Intractable chronic migraine without aura and without status migrainosus    Chronic migraine w/o aura w/o status " "migrainosus, not intractable       Medications:   Current Outpatient Medications on File Prior to Visit   Medication Sig Dispense Refill    propranolol (INDERAL) 40 MG Tab Take 40 mg by mouth 1 time a day as needed (anxiety).      triamcinolone (KENALOG) 0.1 % lotion Apply 1 Application topically 2 times a day. APPLY TOPICALLY TO THE AFFECTED AREA TWICE DAILY      meloxicam (MOBIC) 15 MG tablet Take 15 mg by mouth every day.      Omega-3 Fatty Acids (FISH OIL) 1200 MG CAPSULE DELAYED RELEASE Take  by mouth.      cloNIDine (CATAPRES) 0.1 MG Tab TAKE 1 TABLET BY MOUTH EVERY DAY AS NEEDED FOR ANXIETY      ALPRAZolam (XANAX) 0.5 MG Tab TAKE 0.5 - 1 TABLET BY MOUTH ONCE A DAY AS NEEDED FOR ANXIETY FOR 30 DAYS      amphetamine-dextroamphetamine (ADDERALL) 10 MG Tab Take 10 mg by mouth 2 times a day.      Multiple Vitamins-Minerals (DAILY MULTIVITAMIN PO) Take 1 Tab by mouth every day.       No current facility-administered medications on file prior to visit.        Objective:     /88 (BP Location: Left arm, Patient Position: Sitting, BP Cuff Size: Large adult)   Pulse 70   Temp 36.9 °C (98.4 °F) (Temporal)   Resp 20   Ht 1.803 m (5' 11\")   Wt 124 kg (273 lb)   SpO2 98%   BMI 38.08 kg/m²     Physical Exam  Nursing note reviewed.   Constitutional:       General: He is not in acute distress.     Appearance: Normal appearance. He is well-developed and well-groomed. He is not toxic-appearing.   HENT:      Head: Normocephalic and atraumatic.      Right Ear: External ear normal. Swelling present. A middle ear effusion is present. Tympanic membrane is erythematous and bulging.      Left Ear: External ear normal.      Nose: Nose normal.      Mouth/Throat:      Mouth: Mucous membranes are moist.      Pharynx: Posterior oropharyngeal erythema present.   Eyes:      General:         Right eye: Discharge present.         Left eye: Discharge present.     Conjunctiva/sclera: Conjunctivae normal.      Pupils: Pupils are " equal, round, and reactive to light.   Cardiovascular:      Rate and Rhythm: Normal rate and regular rhythm.      Pulses: Normal pulses.      Heart sounds: Normal heart sounds.   Pulmonary:      Effort: Pulmonary effort is normal. No accessory muscle usage.      Breath sounds: Normal breath sounds and air entry.   Musculoskeletal:      Cervical back: Neck supple.   Lymphadenopathy:      Head:      Right side of head: No tonsillar adenopathy.      Left side of head: No tonsillar adenopathy.      Cervical: No cervical adenopathy.      Upper Body:      Right upper body: No supraclavicular adenopathy.      Left upper body: No supraclavicular adenopathy.   Skin:     General: Skin is warm and dry.      Capillary Refill: Capillary refill takes less than 2 seconds.   Neurological:      Mental Status: He is alert and oriented to person, place, and time.   Psychiatric:         Mood and Affect: Mood normal.         Behavior: Behavior normal.         Thought Content: Thought content normal.         Assessment /Associated Orders:      1. Acute suppurative otitis media of right ear without spontaneous rupture of tympanic membrane, recurrence not specified  amoxicillin (AMOXIL) 875 MG tablet            Medical Decision Making:    Librado is a very pleasant 33 y.o. male who is clinically stable at today's acute urgent care visit.  No acute distress noted.  VSS. Appropriate for outpatient care at this time.   Acute problem today with uncertain prognosis.   Educated in proper administration of  prescription medication(s) ordered today including safety, possible SE, risks, benefits, rationale and alternatives to therapy.   OTC  analgesic of choice (acetaminophen or NSAID) prn pain. Follow manufactures dosing and safety precautions.   Keep well hydrated    Discussed Dx, management options (risks,benefits, and alternatives to planned treatment), natural progression and supportive care.  Expressed understanding and the treatment plan was  agreed upon.   Questions were encouraged and answered   Return to urgent care prn if new or worsening sx or if there is no improvement in condition prn.          Please note that this dictation was created using voice recognition software. I have worked with consultants from the vendor as well as technical experts from [facility] to optimize the interface. I have made every reasonable attempt to correct obvious errors, but I expect that there are errors of grammar and possibly content that I did not discover before finalizing the note.  This note was electronically signed by provider

## (undated) DEVICE — GOWN WARMING STANDARD FLEX - (30/CA)

## (undated) DEVICE — KIT ANESTHESIA W/CIRCUIT & 3/LT BAG W/FILTER (20EA/CA)

## (undated) DEVICE — ELECTRODE DUAL RETURN W/ CORD - (50/PK)

## (undated) DEVICE — ELECTRODE 850 FOAM ADHESIVE - HYDROGEL RADIOTRNSPRNT (50/PK)

## (undated) DEVICE — NEPTUNE 4 PORT MANIFOLD - (20/PK)

## (undated) DEVICE — HEAD HOLDER JUNIOR/ADULT

## (undated) DEVICE — ANTI-FOG SOLUTION - 60BTL/CA

## (undated) DEVICE — CANISTER SUCTION 3000ML MECHANICAL FILTER AUTO SHUTOFF MEDI-VAC NONSTERILE LF DISP  (40EA/CA)

## (undated) DEVICE — PACK ENT OR - (2EA/CA)

## (undated) DEVICE — SENSOR SPO2 NEO LNCS ADHESIVE (20/BX) SEE USER NOTES

## (undated) DEVICE — PROTECTOR ULNA NERVE - (36PR/CA)

## (undated) DEVICE — KIT SURGIFLO W/OUT THROMBIN - (6EA/CA)

## (undated) DEVICE — SOD. CHL. INJ. 0.9% 250 ML - (36/CA 50CA/PF)

## (undated) DEVICE — KIT  I.V. START (100EA/CA)

## (undated) DEVICE — LACTATED RINGERS INJ 1000 ML - (14EA/CA 60CA/PF)

## (undated) DEVICE — TUBING CLEARLINK DUO-VENT - C-FLO (48EA/CA)

## (undated) DEVICE — SLEEVE, VASO, THIGH, MED

## (undated) DEVICE — SUTURE GENERAL

## (undated) DEVICE — Device

## (undated) DEVICE — GLOVE BIOGEL SZ 7.5 SURGICAL PF LTX - (50PR/BX 4BX/CA)

## (undated) DEVICE — BLADE INFERIOR TURBINATE 2.9MM (5EA/PK)

## (undated) DEVICE — TUBE CONNECTING SUCTION - CLEAR PLASTIC STERILE 72 IN (50EA/CA)

## (undated) DEVICE — PATTIES SURG X-RAYCOTTONOID - 1/2 X 3 IN (200/CA)

## (undated) DEVICE — WATER IRRIGATION STERILE 1000ML (12EA/CA)

## (undated) DEVICE — MASK ANESTHESIA ADULT  - (100/CA)

## (undated) DEVICE — SET LEADWIRE 5 LEAD BEDSIDE DISPOSABLE ECG (1SET OF 5/EA)

## (undated) DEVICE — SPLINT NASAL DOYLE AIRWAY (2EA/ST)

## (undated) DEVICE — CANISTER SUCTION RIGID RED 1500CC (40EA/CA)

## (undated) DEVICE — SODIUM CHL IRRIGATION 0.9% 1000ML (12EA/CA)

## (undated) DEVICE — TUBE E-T HI-LO CUFF 7.5MM (10EA/PK)

## (undated) DEVICE — CATHETER IV 20 GA X 1-1/4 ---SURG.& SDS ONLY--- (50EA/BX)

## (undated) DEVICE — SUCTION INSTRUMENT YANKAUER BULBOUS TIP W/O VENT (50EA/CA)

## (undated) DEVICE — SUTURE 3-0 ETHILON FS-1 - (36/BX) 30 INCH